# Patient Record
Sex: MALE | Race: BLACK OR AFRICAN AMERICAN | NOT HISPANIC OR LATINO
[De-identification: names, ages, dates, MRNs, and addresses within clinical notes are randomized per-mention and may not be internally consistent; named-entity substitution may affect disease eponyms.]

---

## 2020-05-14 ENCOUNTER — NON-APPOINTMENT (OUTPATIENT)
Age: 50
End: 2020-05-14

## 2020-05-14 ENCOUNTER — APPOINTMENT (OUTPATIENT)
Dept: CARDIOLOGY | Facility: CLINIC | Age: 50
End: 2020-05-14
Payer: COMMERCIAL

## 2020-05-14 VITALS
BODY MASS INDEX: 22.22 KG/M2 | DIASTOLIC BLOOD PRESSURE: 66 MMHG | TEMPERATURE: 98 F | RESPIRATION RATE: 18 BRPM | HEART RATE: 50 BPM | HEIGHT: 69 IN | SYSTOLIC BLOOD PRESSURE: 154 MMHG | OXYGEN SATURATION: 100 % | WEIGHT: 150 LBS

## 2020-05-14 PROCEDURE — 93000 ELECTROCARDIOGRAM COMPLETE: CPT

## 2020-05-14 PROCEDURE — 36415 COLL VENOUS BLD VENIPUNCTURE: CPT

## 2020-05-14 PROCEDURE — 99205 OFFICE O/P NEW HI 60 MIN: CPT

## 2020-05-14 RX ORDER — CHOLECALCIFEROL (VITAMIN D3) 25 MCG
TABLET ORAL
Refills: 0 | Status: ACTIVE | COMMUNITY

## 2020-05-14 RX ORDER — CLOTRIMAZOLE AND BETAMETHASONE DIPROPIONATE 10; .5 MG/G; MG/G
CREAM TOPICAL
Refills: 0 | Status: ACTIVE | COMMUNITY

## 2020-05-18 LAB
ALBUMIN SERPL ELPH-MCNC: 4.6 G/DL
ALP BLD-CCNC: 46 U/L
ALT SERPL-CCNC: 14 U/L
ANION GAP SERPL CALC-SCNC: 12 MMOL/L
AST SERPL-CCNC: 24 U/L
BASOPHILS # BLD AUTO: 0.03 K/UL
BASOPHILS NFR BLD AUTO: 0.6 %
BILIRUB SERPL-MCNC: 0.4 MG/DL
BUN SERPL-MCNC: 9 MG/DL
CALCIUM SERPL-MCNC: 9.6 MG/DL
CHLORIDE SERPL-SCNC: 101 MMOL/L
CHOLEST SERPL-MCNC: 141 MG/DL
CHOLEST/HDLC SERPL: 3 RATIO
CO2 SERPL-SCNC: 25 MMOL/L
CREAT SERPL-MCNC: 1.1 MG/DL
EOSINOPHIL # BLD AUTO: 0.27 K/UL
EOSINOPHIL NFR BLD AUTO: 5.6 %
GLUCOSE SERPL-MCNC: 104 MG/DL
HCT VFR BLD CALC: 35.9 %
HDLC SERPL-MCNC: 47 MG/DL
HGB BLD-MCNC: 11.7 G/DL
IMM GRANULOCYTES NFR BLD AUTO: 0.2 %
LDLC SERPL CALC-MCNC: 84 MG/DL
LYMPHOCYTES # BLD AUTO: 1.17 K/UL
LYMPHOCYTES NFR BLD AUTO: 24.3 %
MAN DIFF?: NORMAL
MCHC RBC-ENTMCNC: 29.9 PG
MCHC RBC-ENTMCNC: 32.6 GM/DL
MCV RBC AUTO: 91.8 FL
MONOCYTES # BLD AUTO: 0.44 K/UL
MONOCYTES NFR BLD AUTO: 9.1 %
NEUTROPHILS # BLD AUTO: 2.89 K/UL
NEUTROPHILS NFR BLD AUTO: 60.2 %
NT-PROBNP SERPL-MCNC: 188 PG/ML
PLATELET # BLD AUTO: 224 K/UL
POTASSIUM SERPL-SCNC: 4.2 MMOL/L
PROT SERPL-MCNC: 7.8 G/DL
RBC # BLD: 3.91 M/UL
RBC # FLD: 13.6 %
SODIUM SERPL-SCNC: 139 MMOL/L
TRIGL SERPL-MCNC: 51 MG/DL
TSH SERPL-ACNC: 1.5 UIU/ML
WBC # FLD AUTO: 4.81 K/UL

## 2020-05-21 ENCOUNTER — APPOINTMENT (OUTPATIENT)
Dept: CV DIAGNOSTICS | Facility: HOSPITAL | Age: 50
End: 2020-05-21

## 2020-05-21 ENCOUNTER — APPOINTMENT (OUTPATIENT)
Dept: CV DIAGNOSITCS | Facility: HOSPITAL | Age: 50
End: 2020-05-21

## 2020-05-21 ENCOUNTER — TRANSCRIPTION ENCOUNTER (OUTPATIENT)
Age: 50
End: 2020-05-21

## 2020-05-21 ENCOUNTER — OUTPATIENT (OUTPATIENT)
Dept: OUTPATIENT SERVICES | Facility: HOSPITAL | Age: 50
LOS: 1 days | End: 2020-05-21
Payer: COMMERCIAL

## 2020-05-21 DIAGNOSIS — R00.1 BRADYCARDIA, UNSPECIFIED: ICD-10-CM

## 2020-05-21 DIAGNOSIS — I49.3 VENTRICULAR PREMATURE DEPOLARIZATION: ICD-10-CM

## 2020-05-21 DIAGNOSIS — I50.9 HEART FAILURE, UNSPECIFIED: ICD-10-CM

## 2020-05-21 PROCEDURE — 93018 CV STRESS TEST I&R ONLY: CPT | Mod: GC

## 2020-05-21 PROCEDURE — 93306 TTE W/DOPPLER COMPLETE: CPT | Mod: 26

## 2020-05-21 PROCEDURE — 93016 CV STRESS TEST SUPVJ ONLY: CPT | Mod: GC

## 2020-05-22 ENCOUNTER — APPOINTMENT (OUTPATIENT)
Dept: CARDIOLOGY | Facility: CLINIC | Age: 50
End: 2020-05-22
Payer: COMMERCIAL

## 2020-05-22 PROCEDURE — 99215 OFFICE O/P EST HI 40 MIN: CPT | Mod: 95

## 2020-06-30 RX ORDER — SACUBITRIL AND VALSARTAN 24; 26 MG/1; MG/1
24-26 TABLET, FILM COATED ORAL TWICE DAILY
Qty: 180 | Refills: 3 | Status: ACTIVE | COMMUNITY
Start: 1900-01-01 | End: 1900-01-01

## 2020-07-06 ENCOUNTER — INPATIENT (INPATIENT)
Facility: HOSPITAL | Age: 50
LOS: 0 days | Discharge: HOME | End: 2020-07-07
Attending: HOSPITALIST | Admitting: HOSPITALIST
Payer: COMMERCIAL

## 2020-07-06 VITALS
HEART RATE: 79 BPM | OXYGEN SATURATION: 100 % | RESPIRATION RATE: 15 BRPM | SYSTOLIC BLOOD PRESSURE: 140 MMHG | DIASTOLIC BLOOD PRESSURE: 88 MMHG | TEMPERATURE: 98 F

## 2020-07-06 DIAGNOSIS — I10 ESSENTIAL (PRIMARY) HYPERTENSION: ICD-10-CM

## 2020-07-06 DIAGNOSIS — Z29.9 ENCOUNTER FOR PROPHYLACTIC MEASURES, UNSPECIFIED: ICD-10-CM

## 2020-07-06 DIAGNOSIS — E87.6 HYPOKALEMIA: ICD-10-CM

## 2020-07-06 DIAGNOSIS — I50.22 CHRONIC SYSTOLIC (CONGESTIVE) HEART FAILURE: ICD-10-CM

## 2020-07-06 DIAGNOSIS — I20.8 OTHER FORMS OF ANGINA PECTORIS: ICD-10-CM

## 2020-07-06 DIAGNOSIS — R07.9 CHEST PAIN, UNSPECIFIED: ICD-10-CM

## 2020-07-06 LAB
ALBUMIN SERPL ELPH-MCNC: 4.6 G/DL — SIGNIFICANT CHANGE UP (ref 3.3–5)
ALP SERPL-CCNC: 51 U/L — SIGNIFICANT CHANGE UP (ref 40–120)
ALT FLD-CCNC: 11 U/L — SIGNIFICANT CHANGE UP (ref 4–41)
ANION GAP SERPL CALC-SCNC: 9 MMO/L — SIGNIFICANT CHANGE UP (ref 7–14)
APTT BLD: 29.9 SEC — SIGNIFICANT CHANGE UP (ref 27–36.3)
AST SERPL-CCNC: 16 U/L — SIGNIFICANT CHANGE UP (ref 4–40)
BASOPHILS # BLD AUTO: 0.01 K/UL — SIGNIFICANT CHANGE UP (ref 0–0.2)
BASOPHILS NFR BLD AUTO: 0.3 % — SIGNIFICANT CHANGE UP (ref 0–2)
BILIRUB SERPL-MCNC: 0.4 MG/DL — SIGNIFICANT CHANGE UP (ref 0.2–1.2)
BUN SERPL-MCNC: 11 MG/DL — SIGNIFICANT CHANGE UP (ref 7–23)
CALCIUM SERPL-MCNC: 9.3 MG/DL — SIGNIFICANT CHANGE UP (ref 8.4–10.5)
CHLORIDE SERPL-SCNC: 103 MMOL/L — SIGNIFICANT CHANGE UP (ref 98–107)
CO2 SERPL-SCNC: 28 MMOL/L — SIGNIFICANT CHANGE UP (ref 22–31)
CREAT SERPL-MCNC: 1.2 MG/DL — SIGNIFICANT CHANGE UP (ref 0.5–1.3)
EOSINOPHIL # BLD AUTO: 0.07 K/UL — SIGNIFICANT CHANGE UP (ref 0–0.5)
EOSINOPHIL NFR BLD AUTO: 1.8 % — SIGNIFICANT CHANGE UP (ref 0–6)
GLUCOSE SERPL-MCNC: 135 MG/DL — HIGH (ref 70–99)
HCT VFR BLD CALC: 37.8 % — LOW (ref 39–50)
HGB BLD-MCNC: 12.9 G/DL — LOW (ref 13–17)
IMM GRANULOCYTES NFR BLD AUTO: 0.3 % — SIGNIFICANT CHANGE UP (ref 0–1.5)
INR BLD: 1.14 — SIGNIFICANT CHANGE UP (ref 0.88–1.17)
LYMPHOCYTES # BLD AUTO: 1.26 K/UL — SIGNIFICANT CHANGE UP (ref 1–3.3)
LYMPHOCYTES # BLD AUTO: 32.2 % — SIGNIFICANT CHANGE UP (ref 13–44)
MCHC RBC-ENTMCNC: 31.5 PG — SIGNIFICANT CHANGE UP (ref 27–34)
MCHC RBC-ENTMCNC: 34.1 % — SIGNIFICANT CHANGE UP (ref 32–36)
MCV RBC AUTO: 92.2 FL — SIGNIFICANT CHANGE UP (ref 80–100)
MONOCYTES # BLD AUTO: 0.29 K/UL — SIGNIFICANT CHANGE UP (ref 0–0.9)
MONOCYTES NFR BLD AUTO: 7.4 % — SIGNIFICANT CHANGE UP (ref 2–14)
NEUTROPHILS # BLD AUTO: 2.27 K/UL — SIGNIFICANT CHANGE UP (ref 1.8–7.4)
NEUTROPHILS NFR BLD AUTO: 58 % — SIGNIFICANT CHANGE UP (ref 43–77)
NRBC # FLD: 0 K/UL — SIGNIFICANT CHANGE UP (ref 0–0)
NT-PROBNP SERPL-SCNC: 70.32 PG/ML — SIGNIFICANT CHANGE UP
PLATELET # BLD AUTO: 195 K/UL — SIGNIFICANT CHANGE UP (ref 150–400)
PMV BLD: 10.1 FL — SIGNIFICANT CHANGE UP (ref 7–13)
POTASSIUM SERPL-MCNC: 3.3 MMOL/L — LOW (ref 3.5–5.3)
POTASSIUM SERPL-SCNC: 3.3 MMOL/L — LOW (ref 3.5–5.3)
PROT SERPL-MCNC: 7.1 G/DL — SIGNIFICANT CHANGE UP (ref 6–8.3)
PROTHROM AB SERPL-ACNC: 13.1 SEC — SIGNIFICANT CHANGE UP (ref 9.8–13.1)
RBC # BLD: 4.1 M/UL — LOW (ref 4.2–5.8)
RBC # FLD: 13.2 % — SIGNIFICANT CHANGE UP (ref 10.3–14.5)
SARS-COV-2 RNA SPEC QL NAA+PROBE: SIGNIFICANT CHANGE UP
SODIUM SERPL-SCNC: 140 MMOL/L — SIGNIFICANT CHANGE UP (ref 135–145)
TROPONIN T, HIGH SENSITIVITY: < 6 NG/L — SIGNIFICANT CHANGE UP (ref ?–14)
WBC # BLD: 3.91 K/UL — SIGNIFICANT CHANGE UP (ref 3.8–10.5)
WBC # FLD AUTO: 3.91 K/UL — SIGNIFICANT CHANGE UP (ref 3.8–10.5)

## 2020-07-06 PROCEDURE — 99223 1ST HOSP IP/OBS HIGH 75: CPT | Mod: GC

## 2020-07-06 PROCEDURE — 71045 X-RAY EXAM CHEST 1 VIEW: CPT | Mod: 26

## 2020-07-06 PROCEDURE — 99284 EMERGENCY DEPT VISIT MOD MDM: CPT

## 2020-07-06 RX ORDER — ASPIRIN/CALCIUM CARB/MAGNESIUM 324 MG
162 TABLET ORAL ONCE
Refills: 0 | Status: COMPLETED | OUTPATIENT
Start: 2020-07-06 | End: 2020-07-06

## 2020-07-06 RX ORDER — POTASSIUM CHLORIDE 20 MEQ
40 PACKET (EA) ORAL ONCE
Refills: 0 | Status: COMPLETED | OUTPATIENT
Start: 2020-07-06 | End: 2020-07-06

## 2020-07-06 RX ORDER — ZINC SULFATE TAB 220 MG (50 MG ZINC EQUIVALENT) 220 (50 ZN) MG
220 TAB ORAL
Refills: 0 | Status: DISCONTINUED | OUTPATIENT
Start: 2020-07-06 | End: 2020-07-07

## 2020-07-06 RX ORDER — SACUBITRIL AND VALSARTAN 24; 26 MG/1; MG/1
1 TABLET, FILM COATED ORAL
Refills: 0 | Status: DISCONTINUED | OUTPATIENT
Start: 2020-07-06 | End: 2020-07-07

## 2020-07-06 RX ORDER — ASPIRIN/CALCIUM CARB/MAGNESIUM 324 MG
81 TABLET ORAL DAILY
Refills: 0 | Status: DISCONTINUED | OUTPATIENT
Start: 2020-07-06 | End: 2020-07-07

## 2020-07-06 RX ORDER — CHOLECALCIFEROL (VITAMIN D3) 125 MCG
1000 CAPSULE ORAL DAILY
Refills: 0 | Status: DISCONTINUED | OUTPATIENT
Start: 2020-07-06 | End: 2020-07-07

## 2020-07-06 RX ORDER — HEPARIN SODIUM 5000 [USP'U]/ML
5000 INJECTION INTRAVENOUS; SUBCUTANEOUS EVERY 8 HOURS
Refills: 0 | Status: DISCONTINUED | OUTPATIENT
Start: 2020-07-06 | End: 2020-07-07

## 2020-07-06 RX ORDER — ACETAMINOPHEN 500 MG
650 TABLET ORAL EVERY 6 HOURS
Refills: 0 | Status: DISCONTINUED | OUTPATIENT
Start: 2020-07-06 | End: 2020-07-07

## 2020-07-06 RX ORDER — ASCORBIC ACID 60 MG
500 TABLET,CHEWABLE ORAL DAILY
Refills: 0 | Status: DISCONTINUED | OUTPATIENT
Start: 2020-07-06 | End: 2020-07-07

## 2020-07-06 RX ADMIN — Medication 81 MILLIGRAM(S): at 18:42

## 2020-07-06 RX ADMIN — Medication 40 MILLIEQUIVALENT(S): at 18:42

## 2020-07-06 RX ADMIN — Medication 40 MILLIEQUIVALENT(S): at 15:34

## 2020-07-06 RX ADMIN — Medication 500 MILLIGRAM(S): at 18:42

## 2020-07-06 RX ADMIN — Medication 162 MILLIGRAM(S): at 12:05

## 2020-07-06 RX ADMIN — SACUBITRIL AND VALSARTAN 1 TABLET(S): 24; 26 TABLET, FILM COATED ORAL at 19:49

## 2020-07-06 NOTE — H&P ADULT - PROBLEM SELECTOR PLAN 1
tele monitor   cardiac enzymes trop<6  Serial EKGs  started ecotrin 81mg po daily  Cardiology Dr. Valdes consulted  -Tylenol PRN pain

## 2020-07-06 NOTE — H&P ADULT - ASSESSMENT
48 yo male PMHx HTN and Systolic HF (on Entresto) p/w chest pain x2days, admitted to tele for atypical chest pain, r/o ACS.

## 2020-07-06 NOTE — H&P ADULT - NSHPLABSRESULTS_GEN_ALL_CORE
EKG: Sinus Rhythm w/ Bigeminy @ 74bpm TWI II, III, AVF, V4-V6.  CXR: neg  Trop<6  COVID: neg  TTE on 5/21/2020: EF 35-40%  Moderate global left ventricular systolic dysfunction.  Frequent ventricular ectopy noted during study.  K+ 3.3 12.9   3.91  )-----------( 195      ( 06 Jul 2020 11:15 )             37.8     07-06    140  |  103  |  11  ----------------------------<  135<H>  3.3<L>   |  28  |  1.20    Ca    9.3      06 Jul 2020 11:15    TPro  7.1  /  Alb  4.6  /  TBili  0.4  /  DBili  x   /  AST  16  /  ALT  11  /  AlkPhos  51  07-06    Trop<6    COVID: neg    EKG 7/6: [x] personally reviewed by me - Sinus Rhythm w/ Bigeminy @ 74bpm TWI II, III, AVF, V4-V6. - per allscripts this is c/w baseline findings     CXR 7/6 - personally reviewed by me - clear lungs    < from: Cardiac Treadmill Stress Test (Non Imaging) (05.21.20 @ 12:57) >    STRESS TEST IMPRESSIONS:  Exercise capacity: 8 METS, Poor for age and gender. 97% of  MPHR.  Chest Pain: No chest pain with exercise.  Symptom: no chest pain.  HR Response: Appropriate.  BP Response: Appropriate.  Heart Rhythm: Normal Sinus Rhythm - Frequent polymorphic  VPD's.  Baseline ECG: T wave abnormality in II , III, aVF, V6,  inverted.  ECG Abnormalities: None.  Arrhythmia: Frequent polymorphic VPDs occurred during  rest, stress and recovery. Frequencyof VPDs was  unaffected by stress. There was one 3-beat run VT at 2:50  recovery.  Morel Score: 8    < end of copied text >    < from: Transthoracic Echocardiogram (05.21.20 @ 10:16) >    CONCLUSIONS:  1. Normal left ventricular internal dimensions and wall  thicknesses.  2. Moderate global left ventricular systolic dysfunction.  Frequent ventricular ectopy noted during study.  3. Normal right ventricular size and function.    < end of copied text >    [x] reviewed May 2020 stress test - unremarkable, baseline EKG with TWI II, III, AVF, V6. TTE May 2020 EF 35-40%, no valvular abnormalities.   [x] d/w cardiology Dr. Valdes - admit to tele monitoring will see in AM

## 2020-07-06 NOTE — ED PROVIDER NOTE - ATTENDING CONTRIBUTION TO CARE
Gong: I have seen and examined the patient face to face, have reviewed and addended the HPI, PE and a/p as necessary.    48 yo M with NICM EF 35-40% and HTN a/w cp x 2 days, localized to the right side described as dull.  Noted to be intermittent, not associated with ambulation, or exertion.  Went to urgent care today found to have abnormal ekg.  Pt reports previous COVID in April, and last stress test and TTE in May.      No fever/chills, No photophobia/eye pain/changes in vision, No ear pain/sore throat/dysphagia, no SOB/cough/wheeze/stridor, No abd pain, No N/V/D, no dysuria/frequency/discharge, No neck/back pain, no rash, no changes in neurological status/function.     EKG TWI in II, III, aVF with new V4 and V5 TWI since May.    GEN - NAD; well appearing; A+O x3; non-toxic appearing; CARD -s1s2, RRR, no M,G,R; PULM - CTA b/l, symmetric breath sounds; ABD -  +BS, ND, NT, soft, no guarding, no rebound, no masses; BACK - no CVA tenderness, Normal  spine; EXT - symmetric pulses, 2+ dp, capillary refill < 2 seconds, no cyanosis, no edema; NEURO - no focal neuro deficits, no slurred speech    48 yo M with NICM EF 35-40% and HTN a/w cp x 2 days, localized to the right side described as dull with new ekg changes, and recent stress and echo, will discuss with Dr. Valdes, and likely admit for possible ACS, check trops, chest xray, repeat ekg, reassess.  Aspirin.

## 2020-07-06 NOTE — H&P ADULT - HISTORY OF PRESENT ILLNESS
50 yo male PMHx HTN and Systolic HF (on Entresto) p/w chest pain x2days. Chest pain right sided pressure + ache, 5/10, non-pleuritic, non-radiating, intermittent in nature. Pt took Tumeric supplement last night and chest pain resolved today. However, he went to [facility] today and found to have an "Abnormal EKG" and was told to see his cardiologist. When he called Dr. Valdes, he was told to come [facility] ED for evaluation. Pt denies fever, chills, cough, congestion, sob, palpitations, nausea, vomiting or abdominal pain.

## 2020-07-06 NOTE — ED PROVIDER NOTE - PHYSICAL EXAMINATION
Gen: AAOx3, non-toxic  Head: NCAT  HEENT: EOMI, oral mucosa moist, normal conjunctiva  Lung: CTAB, no respiratory distress, no wheezes/rhonchi/rales B/L, speaking in full sentences  CV: RRR, no murmurs, rubs or gallops  Abd: soft, NTND, no guarding  MSK: no visible deformities  Neuro: No focal sensory or motor deficits  Skin: Warm, well perfused, no rash  Psych: normal affect.     ~Xavi Parks PGY3

## 2020-07-06 NOTE — ED PROVIDER NOTE - OBJECTIVE STATEMENT
49M with PMH including HTN p/w chest pain that began gradually 2 days ago. Described as dull, right-sided, intermittent, no provoking or palliating factors. Denies any associated symptoms including fever, cough, SOB, LE swelling, abd pain, N/V/D, diaphoresis. Went to urgent care today where he was here to the ER for an abnormal EKG. Of not, pt had COVID in early spring since which time he has experienced frequent palpitations. Had a stress test & TTE in May with his cardiologist Massimo Valdes which was sig for EF of 35-40%.

## 2020-07-06 NOTE — ED PROVIDER NOTE - NS ED ROS FT
ROS:  GENERAL: No fever, no chills  EYES: no change in vision  HEENT: no trouble swallowing, no trouble speaking  CARDIAC: +chest pain  PULMONARY: no cough, no shortness of breath  GI: no abdominal pain, no nausea, no vomiting, no diarrhea, no constipation  : No dysuria, no frequency, no change in appearance, or odor of urine  SKIN: no rashes  NEURO: no headache, no weakness  MSK: No joint pain    Xavi Parks PGY3

## 2020-07-06 NOTE — ED ADULT NURSE NOTE - OBJECTIVE STATEMENT
Pt A+OX4 c/o right sided CP since Saturday.  Denies SOB.  Says he has an irregular rhythm and is on a blood thinner.  [facility] and was told he has an abnormal rhythm and was told to go to the ER.  MD aware of rhythm.  EKG done.  CM placed.  Labs obtained and sent as ordered.  #20g SL R AC placed.  NAD.  VSS.  Will monitor. Pt A+OX4 c/o right sided CP since Saturday.  Denies SOB.  Says he has an irregular rhythm and is on a blood thinner.  [facility] and was told he has an abnormal rhythm and was told to go to the ER.  Yonathan noted on CM.  MD aware of rhythm.  EKG done.  CM placed.  Labs obtained and sent as ordered.  #20g SL R AC placed.  NAD.  VSS.  Will monitor.

## 2020-07-06 NOTE — H&P ADULT - PROBLEM SELECTOR PLAN 3
Monitor BP daily  DASH diet  Continue Entresto  will consider Hydralazine if BP uncontrolled w/ Entresto

## 2020-07-06 NOTE — H&P ADULT - RS GEN PE MLT RESP DETAILS PC
airway patent/respirations non-labored/chest wall tenderness/breath sounds equal/clear to auscultation bilaterally/good air movement/no rales/no rhonchi/no wheezes

## 2020-07-06 NOTE — ED ADULT NURSE REASSESSMENT NOTE - NS ED NURSE REASSESS COMMENT FT1
Pt received from [facility], vitally stable, no complaints at this time. Will continue to monitor.

## 2020-07-06 NOTE — H&P ADULT - NEGATIVE CARDIOVASCULAR SYMPTOMS
no orthopnea/no claudication/no palpitations/no paroxysmal nocturnal dyspnea/no dyspnea on exertion/no peripheral edema

## 2020-07-06 NOTE — ED PROVIDER NOTE - CLINICAL SUMMARY MEDICAL DECISION MAKING FREE TEXT BOX
Chest pain x 2 days in pt with hx of HTN and decreased EF. Found to have new T wave inversions in V4-V5. Pt well appearing, comfortable, HD stable. Low suspicion for dissection or PE based on exam/history. Will assess for ACS, PTX, PNA with labs/XR then determine need for further evaluation/admission.

## 2020-07-06 NOTE — ED ADULT TRIAGE NOTE - CHIEF COMPLAINT QUOTE
pt with abnormal EKG sent from urgent care. pt had right sided chest pain this am. Denies chest pain, shortness of breath, lightheadedness and dizziness.

## 2020-07-06 NOTE — H&P ADULT - PROBLEM SELECTOR PLAN 2
tele monitor  2G Sodium diet with 1500 cc Fluid restriction  Strict I&Os plus Daily weights.  c/w Entresto  Pt currently evolemic

## 2020-07-06 NOTE — H&P ADULT - ATTENDING COMMENTS
49M with hx of HFrEF of unclear etiology (TTE 05/2020 with EF 35-40%, no valvular abnormalities), stress test May 2020 negative, possibly related to myocarditis related to prior COVID-19 infection sent from urgent care for abnormal EKG (TWI in lateral leads). Patient presented to urgent care with reproducible, non-radiating, right sided chest pain, found to have TWI and advised to present to ED. At the time of my exam patient denies chest pain, sob, fevers, chills, cough, abdominal pain, n/v/d, LE swelling.     Cardiac Hx - In the spring patient had COVID-19 infection and palpiations for which he had extensive cardiac work up at [facility] (results not in system, per patient unremarkable). He then then saw Dr. Massimo Valdes in May for second opinion. Stress test May 2020 negative (baseline EKG Normal Sinus Rhythm - Frequent polymorphic VPD's T wave abnormality in II , III, aVF, V6, inverted). TTE with EF 35-40%, no valvular abnormalities. Patient was recommended to start a low dose BB for PVCs which patient has not been taking. He has been adherent with prescribed Entresto. He was planned for outpatient cMRI which he has not gotten yet.    VS - BP elevated 172/75. Physcial exam with reproducbile chest pain on right side. Reviewed admission labs/EKG - Troponin <6, ProBNP 70, Hypokalemic 3.3, Hb 12.6. CXR clear lungs. EKG with TWI in II, III, AVF, V6 - per allscripts review this is reflective of baseline EKG.     A/P -     49M with hx of HFrEF of unclear etiology (TTE 05/2020 with EF 35-40%, no valvular abnormalities), stress May 2020 negative, pending outpatient cMRI, sent from  for abnormal EKG and right sided chest pain likely MSK in origin. r/o for ACS (Trop negative, EKG with baseline changes per Allscripts - TWI in II, III, AVF, V6).     Admit for telemetry monitoring. Check ESR/CRP with AM labs, TSH on outpatient labs wnl. Pain control with tyelnol PRN, resume home entresto, low salt diet. Repeat EKG in AM. Cardiology Dr. Massimo Valdes to see patient in AM.     Anemia - Allscripts hemoglobin 11.7 May 2020, currently 12.6 macrocytic, check iron studies/B12/folate in AM. Patient has never had screening colonosocpy - was planning to do this after resolution of cardiac issues. 49M with hx of HFrEF of unclear etiology (TTE 05/2020 with EF 35-40%, no valvular abnormalities), stress test May 2020 negative, possibly related to myocarditis related to prior COVID-19 infection sent from urgent care for abnormal EKG (TWI in lateral leads). Patient presented to urgent care with reproducible, non-radiating, non-pleuritic right sided chest pain, found to have TWI and advised to present to ED. At the time of my exam patient denies chest pain, sob, fevers, chills, cough, abdominal pain, n/v/d, LE swelling.     Cardiac Hx - In the spring patient had COVID-19 infection and palpiations for which he had extensive cardiac work up at [facility] (results not in system, per patient unremarkable). He then then saw Dr. Massimo Valdes in May for second opinion. Stress test May 2020 negative (baseline EKG Normal Sinus Rhythm - Frequent polymorphic VPD's T wave abnormality in II , III, aVF, V6, inverted). TTE with EF 35-40%, no valvular abnormalities. Patient was recommended to start a low dose BB for PVCs which patient has not been taking. He has been adherent with prescribed Entresto. He was planned for outpatient cMRI which he has not gotten yet.    VS - BP elevated 172/75. Physcial exam with reproducbile chest pain on right side. Reviewed admission labs/EKG - Troponin <6, ProBNP 70, Hypokalemic 3.3, Hb 12.6. CXR clear lungs. EKG with TWI in II, III, AVF, V6 - per allscripts review this is reflective of baseline EKG.     A/P -     49M with hx of HFrEF of unclear etiology (TTE 05/2020 with EF 35-40%, no valvular abnormalities), stress May 2020 negative, pending outpatient cMRI, sent from  for abnormal EKG and right sided chest pain likely MSK in origin. r/o for ACS (Trop negative, EKG with baseline changes per Allscripts - TWI in II, III, AVF, V6).     Admit for telemetry monitoring. Check ESR/CRP with AM labs, TSH on outpatient labs wnl. Pain control with tyelnol PRN, resume home entresto, low salt diet. Repeat EKG in AM. Cardiology Dr. Massimo Valdes to see patient in AM.     Anemia - Allscripts hemoglobin 11.7 May 2020, currently 12.6 macrocytic, check iron studies/B12/folate in AM. Patient has never had screening colonosocpy - was planning to do this after resolution of cardiac issues.

## 2020-07-07 ENCOUNTER — TRANSCRIPTION ENCOUNTER (OUTPATIENT)
Age: 50
End: 2020-07-07

## 2020-07-07 VITALS
DIASTOLIC BLOOD PRESSURE: 52 MMHG | SYSTOLIC BLOOD PRESSURE: 128 MMHG | RESPIRATION RATE: 18 BRPM | HEART RATE: 78 BPM | OXYGEN SATURATION: 98 %

## 2020-07-07 DIAGNOSIS — Z02.9 ENCOUNTER FOR ADMINISTRATIVE EXAMINATIONS, UNSPECIFIED: ICD-10-CM

## 2020-07-07 LAB
ANION GAP SERPL CALC-SCNC: 13 MMO/L — SIGNIFICANT CHANGE UP (ref 7–14)
BUN SERPL-MCNC: 12 MG/DL — SIGNIFICANT CHANGE UP (ref 7–23)
CALCIUM SERPL-MCNC: 9.5 MG/DL — SIGNIFICANT CHANGE UP (ref 8.4–10.5)
CHLORIDE SERPL-SCNC: 103 MMOL/L — SIGNIFICANT CHANGE UP (ref 98–107)
CO2 SERPL-SCNC: 26 MMOL/L — SIGNIFICANT CHANGE UP (ref 22–31)
CREAT SERPL-MCNC: 1.21 MG/DL — SIGNIFICANT CHANGE UP (ref 0.5–1.3)
CRP SERPL-MCNC: < 4 MG/L — SIGNIFICANT CHANGE UP
ERYTHROCYTE [SEDIMENTATION RATE] IN BLOOD: 4 MM/HR — SIGNIFICANT CHANGE UP (ref 1–15)
FERRITIN SERPL-MCNC: 288.3 NG/ML — SIGNIFICANT CHANGE UP (ref 30–400)
FOLATE SERPL-MCNC: 16.1 NG/ML — SIGNIFICANT CHANGE UP (ref 4.7–20)
GLUCOSE SERPL-MCNC: 104 MG/DL — HIGH (ref 70–99)
HCT VFR BLD CALC: 39 % — SIGNIFICANT CHANGE UP (ref 39–50)
HGB BLD-MCNC: 13.1 G/DL — SIGNIFICANT CHANGE UP (ref 13–17)
IRON SATN MFR SERPL: 240 UG/DL — SIGNIFICANT CHANGE UP (ref 155–535)
IRON SATN MFR SERPL: 49 UG/DL — SIGNIFICANT CHANGE UP (ref 45–165)
MAGNESIUM SERPL-MCNC: 2.4 MG/DL — SIGNIFICANT CHANGE UP (ref 1.6–2.6)
MCHC RBC-ENTMCNC: 30.8 PG — SIGNIFICANT CHANGE UP (ref 27–34)
MCHC RBC-ENTMCNC: 33.6 % — SIGNIFICANT CHANGE UP (ref 32–36)
MCV RBC AUTO: 91.5 FL — SIGNIFICANT CHANGE UP (ref 80–100)
NRBC # FLD: 0 K/UL — SIGNIFICANT CHANGE UP (ref 0–0)
PHOSPHATE SERPL-MCNC: 4.3 MG/DL — SIGNIFICANT CHANGE UP (ref 2.5–4.5)
PLATELET # BLD AUTO: 195 K/UL — SIGNIFICANT CHANGE UP (ref 150–400)
PMV BLD: 10.4 FL — SIGNIFICANT CHANGE UP (ref 7–13)
POTASSIUM SERPL-MCNC: 3.7 MMOL/L — SIGNIFICANT CHANGE UP (ref 3.5–5.3)
POTASSIUM SERPL-SCNC: 3.7 MMOL/L — SIGNIFICANT CHANGE UP (ref 3.5–5.3)
RBC # BLD: 4.26 M/UL — SIGNIFICANT CHANGE UP (ref 4.2–5.8)
RBC # FLD: 13.2 % — SIGNIFICANT CHANGE UP (ref 10.3–14.5)
SODIUM SERPL-SCNC: 142 MMOL/L — SIGNIFICANT CHANGE UP (ref 135–145)
TROPONIN T, HIGH SENSITIVITY: 7 NG/L — SIGNIFICANT CHANGE UP (ref ?–14)
UIBC SERPL-MCNC: 191.1 UG/DL — SIGNIFICANT CHANGE UP (ref 110–370)
VIT B12 SERPL-MCNC: 605 PG/ML — SIGNIFICANT CHANGE UP (ref 200–900)
WBC # BLD: 4.86 K/UL — SIGNIFICANT CHANGE UP (ref 3.8–10.5)
WBC # FLD AUTO: 4.86 K/UL — SIGNIFICANT CHANGE UP (ref 3.8–10.5)

## 2020-07-07 PROCEDURE — 99223 1ST HOSP IP/OBS HIGH 75: CPT

## 2020-07-07 PROCEDURE — 99233 SBSQ HOSP IP/OBS HIGH 50: CPT

## 2020-07-07 PROCEDURE — 99239 HOSP IP/OBS DSCHRG MGMT >30: CPT

## 2020-07-07 PROCEDURE — 70450 CT HEAD/BRAIN W/O DYE: CPT | Mod: 26

## 2020-07-07 RX ORDER — ASPIRIN/CALCIUM CARB/MAGNESIUM 324 MG
1 TABLET ORAL
Qty: 0 | Refills: 0 | DISCHARGE
Start: 2020-07-07

## 2020-07-07 RX ADMIN — Medication 1000 UNIT(S): at 14:08

## 2020-07-07 RX ADMIN — Medication 81 MILLIGRAM(S): at 14:08

## 2020-07-07 RX ADMIN — Medication 500 MILLIGRAM(S): at 06:41

## 2020-07-07 RX ADMIN — SACUBITRIL AND VALSARTAN 1 TABLET(S): 24; 26 TABLET, FILM COATED ORAL at 09:15

## 2020-07-07 NOTE — PROGRESS NOTE ADULT - SUBJECTIVE AND OBJECTIVE BOX
PROGRESS NOTE:     Patient is a 49y old  Male who presents with a chief complaint of chest pain (06 Jul 2020 16:28)      SUBJECTIVE / OVERNIGHT EVENTS:    ADDITIONAL REVIEW OF SYSTEMS:    MEDICATIONS  (STANDING):  ascorbic acid 500 milliGRAM(s) Oral daily  aspirin enteric coated 81 milliGRAM(s) Oral daily  cholecalciferol 1000 Unit(s) Oral daily  heparin   Injectable 5000 Unit(s) SubCutaneous every 8 hours  sacubitril 24 mG/valsartan 26 mG 1 Tablet(s) Oral two times a day  zinc sulfate 220 milliGRAM(s) Oral <User Schedule>    MEDICATIONS  (PRN):  acetaminophen   Tablet .. 650 milliGRAM(s) Oral every 6 hours PRN Mild Pain (1 - 3), Moderate Pain (4 - 6)      CAPILLARY BLOOD GLUCOSE        I&O's Summary      PHYSICAL EXAM:  Vital Signs Last 24 Hrs  T(C): 37.1 (07 Jul 2020 05:43), Max: 37.1 (07 Jul 2020 05:43)  T(F): 98.8 (07 Jul 2020 05:43), Max: 98.8 (07 Jul 2020 05:43)  HR: 72 (07 Jul 2020 08:00) (72 - 90)  BP: 118/82 (07 Jul 2020 08:00) (118/82 - 172/75)  BP(mean): --  RR: 16 (07 Jul 2020 08:00) (15 - 20)  SpO2: 100% (07 Jul 2020 08:00) (95% - 100%)    CONSTITUTIONAL: NAD, well-developed  RESPIRATORY: Normal respiratory effort; lungs are clear to auscultation bilaterally  CARDIOVASCULAR: Regular rate and rhythm, normal S1 and S2, no murmur/rub/gallop; No lower extremity edema; Peripheral pulses are 2+ bilaterally  ABDOMEN: Nontender to palpation, normoactive bowel sounds, no rebound/guarding; No hepatosplenomegaly  MUSCLOSKELETAL: no clubbing or cyanosis of digits; no joint swelling or tenderness to palpation  PSYCH: A+O to person, place, and time; affect appropriate  NEURO:  SKIN:    LABS:                        13.1   4.86  )-----------( 195      ( 07 Jul 2020 05:07 )             39.0     07-07    142  |  103  |  12  ----------------------------<  104<H>  3.7   |  26  |  1.21    Ca    9.5      07 Jul 2020 05:07  Phos  4.3     07-07  Mg     2.4     07-07    TPro  7.1  /  Alb  4.6  /  TBili  0.4  /  DBili  x   /  AST  16  /  ALT  11  /  AlkPhos  51  07-06    PT/INR - ( 06 Jul 2020 11:15 )   PT: 13.1 SEC;   INR: 1.14          PTT - ( 06 Jul 2020 11:15 )  PTT:29.9 SEC            RADIOLOGY & ADDITIONAL TESTS:  Results Reviewed:   Imaging Personally Reviewed:  Electrocardiogram Personally Reviewed:    COORDINATION OF CARE:  Care Discussed with Consultants/Other Providers [Y/N]:  Prior or Outpatient Records Reviewed [Y/N]: PROGRESS NOTE:     Patient is a 49y old  Male who presents with a chief complaint of chest pain (06 Jul 2020 16:28)      SUBJECTIVE / OVERNIGHT EVENTS: pt seen and examined by me   C/o Chest discomfort, intermittent since years,. worse over past few months  Denies SOB   C/o headaches abnormal sensation in head     ADDITIONAL REVIEW OF SYSTEMS:    MEDICATIONS  (STANDING):  ascorbic acid 500 milliGRAM(s) Oral daily  aspirin enteric coated 81 milliGRAM(s) Oral daily  cholecalciferol 1000 Unit(s) Oral daily  heparin   Injectable 5000 Unit(s) SubCutaneous every 8 hours  sacubitril 24 mG/valsartan 26 mG 1 Tablet(s) Oral two times a day  zinc sulfate 220 milliGRAM(s) Oral <User Schedule>    MEDICATIONS  (PRN):  acetaminophen   Tablet .. 650 milliGRAM(s) Oral every 6 hours PRN Mild Pain (1 - 3), Moderate Pain (4 - 6)      CAPILLARY BLOOD GLUCOSE        I&O's Summary      PHYSICAL EXAM:  Vital Signs Last 24 Hrs  T(C): 37.1 (07 Jul 2020 05:43), Max: 37.1 (07 Jul 2020 05:43)  T(F): 98.8 (07 Jul 2020 05:43), Max: 98.8 (07 Jul 2020 05:43)  HR: 72 (07 Jul 2020 08:00) (72 - 90)  BP: 118/82 (07 Jul 2020 08:00) (118/82 - 172/75)  BP(mean): --  RR: 16 (07 Jul 2020 08:00) (15 - 20)  SpO2: 100% (07 Jul 2020 08:00) (95% - 100%)    CONSTITUTIONAL: NAD, well-developed  RESPIRATORY: Normal respiratory effort; lungs are clear to auscultation bilaterally  CARDIOVASCULAR: Regular rate and rhythm, normal S1 and S2, no murmur/rub/gallop; No lower extremity edema; Peripheral pulses are 2+ bilaterally  ABDOMEN: Nontender to palpation, normoactive bowel sounds, no rebound/guarding; No hepatosplenomegaly  MUSCLOSKELETAL: no clubbing or cyanosis of digits; no joint swelling or tenderness to palpation  PSYCH: A+O to person, place, and time; affect appropriate  NEURO: No focal deficits  SKIN: NAD     LABS:                        13.1   4.86  )-----------( 195      ( 07 Jul 2020 05:07 )             39.0     07-07    142  |  103  |  12  ----------------------------<  104<H>  3.7   |  26  |  1.21    Ca    9.5      07 Jul 2020 05:07  Phos  4.3     07-07  Mg     2.4     07-07    TPro  7.1  /  Alb  4.6  /  TBili  0.4  /  DBili  x   /  AST  16  /  ALT  11  /  AlkPhos  51  07-06    PT/INR - ( 06 Jul 2020 11:15 )   PT: 13.1 SEC;   INR: 1.14          PTT - ( 06 Jul 2020 11:15 )  PTT:29.9 SEC            RADIOLOGY & ADDITIONAL TESTS:  Results Reviewed:  CT head - unremarkable   Imaging Personally Reviewed:  Electrocardiogram Personally Reviewed:    COORDINATION OF CARE:  Care Discussed with Consultants/Other Providers [Y/N]: Dr BARRAGAN:alvin   Prior or Outpatient Records Reviewed [Y/N]: Dr Valdes

## 2020-07-07 NOTE — DISCHARGE NOTE PROVIDER - CARE PROVIDERS DIRECT ADDRESSES
[email or web address]

## 2020-07-07 NOTE — DISCHARGE NOTE PROVIDER - PROVIDER TOKENS
PROVIDER:[TOKEN:[[id number]]] PROVIDER:[TOKEN:[[id number]]],PROVIDER:[TOKEN:[[id number]]]

## 2020-07-07 NOTE — DISCHARGE NOTE PROVIDER - NSDCMRMEDTOKEN_GEN_ALL_CORE_FT
Entresto 24 mg-26 mg oral tablet: 1 tab(s) orally 2 times a day  Vitamin C 500 mg oral tablet: 1 tab(s) orally once a day  Vitamin D3 1000 intl units (25 mcg) oral tablet: 1 tab(s) orally once a day  Zinc 140 mg (as elemental zinc 50 mg) oral tablet: 1 tab(s) orally once a day

## 2020-07-07 NOTE — CONSULT NOTE ADULT - SUBJECTIVE AND OBJECTIVE BOX
CHIEF COMPLAINT:    HISTORY OF PRESENT ILLNESS:    PAST MEDICAL & SURGICAL HISTORY:  HTN (hypertension)  Chronic systolic heart failure          PREVIOUS DIAGNOSTIC TESTING:    Echocardiogram:   Catheterization:  Stress Test:  	    MEDICATIONS:  aspirin enteric coated 81 milliGRAM(s) Oral daily  heparin   Injectable 5000 Unit(s) SubCutaneous every 8 hours  sacubitril 24 mG/valsartan 26 mG 1 Tablet(s) Oral two times a day        acetaminophen   Tablet .. 650 milliGRAM(s) Oral every 6 hours PRN        ascorbic acid 500 milliGRAM(s) Oral daily  cholecalciferol 1000 Unit(s) Oral daily  zinc sulfate 220 milliGRAM(s) Oral <User Schedule>      FAMILY HISTORY:  No pertinent family history in first degree relatives      SOCIAL HISTORY:      [ ] Smoker  [ ] Alcohol  [ ] Drugs    Allergies    No Known Allergies    Intolerances    	    REVIEW OF SYSTEMS:  CONSTITUTIONAL: No fever, weight loss, or fatigue  EYES: No eye pain, visual disturbances, or discharge  ENMT:  No difficulty hearing, tinnitus, vertigo; No sinus or throat pain  NECK: No pain or stiffness  RESPIRATORY: No cough, wheezing, chills or hemoptysis; No Shortness of Breath  CARDIOVASCULAR: No chest pain, palpitations, passing out, dizziness, or leg swelling  GASTROINTESTINAL: No abdominal or epigastric pain. No nausea, vomiting, or hematemesis; No diarrhea or constipation. No melena or hematochezia.  GENITOURINARY: No dysuria, frequency, hematuria, or incontinence  NEUROLOGICAL: No headaches, memory loss, loss of strength, numbness, or tremors  SKIN: No itching, burning, rashes, or lesions   LYMPH Nodes: No enlarged glands  ENDOCRINE: No heat or cold intolerance; No hair loss  MUSCULOSKELETAL: No joint pain or swelling; No muscle, back, or extremity pain  PSYCHIATRIC: No depression, anxiety, mood swings, or difficulty sleeping  HEME/LYMPH: No easy bruising, or bleeding gums  ALLERY AND IMMUNOLOGIC: No hives or eczema	    [ ] All others negative	  [ ] Unable to obtain    PHYSICAL EXAM:  T(C): 37.1 (07-07-20 @ 05:43), Max: 37.1 (07-07-20 @ 05:43)  HR: 78 (07-07-20 @ 14:04) (72 - 79)  BP: 128/52 (07-07-20 @ 14:04) (118/82 - 150/79)  RR: 18 (07-07-20 @ 14:04) (16 - 19)  SpO2: 98% (07-07-20 @ 14:04) (95% - 100%)  Wt(kg): --  I&O's Summary      Appearance: Normal	  HEENT:   Normal oral mucosa, PERRL, EOMI	  Lymphatic: No lymphadenopathy  Cardiovascular: Normal S1 S2, No JVD, No murmurs, No edema  Respiratory: Lungs clear to auscultation	  Psychiatry: A & O x 3, Mood & affect appropriate  Gastrointestinal:  Soft, Non-tender, + BS	  Skin: No rashes, No ecchymoses, No cyanosis	  Neurologic: Non-focal  Extremities: Normal range of motion, No clubbing, cyanosis or edema  Vascular: Peripheral pulses palpable 2+ bilaterally    TELEMETRY: 	    ECG:  	  RADIOLOGY:  OTHER: 	  	  LABS:	 	    CARDIAC MARKERS:                                  13.1   4.86  )-----------( 195      ( 07 Jul 2020 05:07 )             39.0     07-07    142  |  103  |  12  ----------------------------<  104<H>  3.7   |  26  |  1.21    Ca    9.5      07 Jul 2020 05:07  Phos  4.3     07-07  Mg     2.4     07-07    TPro  7.1  /  Alb  4.6  /  TBili  0.4  /  DBili  x   /  AST  16  /  ALT  11  /  AlkPhos  51  07-06    proBNP:   Lipid Profile:   HgA1c:   TSH: CHIEF COMPLAINT: Called to evaluate patient with PVCs     HISTORY OF PRESENT ILLNESS:  48 yo male PMHx HTN and Systolic HF (on Entresto) p/w chest pain x2days. Patient reports that he took Tumeric supplement and chest pain resolved. However, [facility] and found to have an "Abnormal EKG" and was told to see his cardiologist. When he called Dr. Valdes, he was told to come [facility] ED for evaluation. EKG revealed sinus rhythm with PVCs, bigeminy. Telemetry shows sinus rhythm with PVCs and HR 60s-80s. Echocardiogram revealed moderate global LV systolic dysfunction with EF 35-40%. Pt denies fever, chills, cough, sob, palpitations, nausea, vomiting, dizziness, syncope or near syncope.      PAST MEDICAL & SURGICAL HISTORY:  HTN (hypertension)  Chronic systolic heart failure    PREVIOUS DIAGNOSTIC TESTING:    Echocardiogram: 5/21/20:   DIMENSIONS:  Dimensions:     Normal Values:  LA:     3.7 cm    2.0 - 4.0 cm  Ao:     2.4 cm    2.0 - 3.8 cm  SEPTUM: 0.9 cm    0.6 - 1.2 cm  PWT:    1.0 cm    0.6 - 1.1 cm  LVIDd:  5.7 cm    3.0 - 5.6 cm  LVIDs:  4.8 cm    1.8 - 4.0 cm  Derived Variables:  LVMI: 115 g/m2  RWT: 0.35  Fractional short: 16 %  Ejection Fraction (Visual Estimate): 35-40 %  ------------------------------------------------------------------------  OBSERVATIONS:  Mitral Valve: Normal mitral valve.  Aortic Root: Normal aortic root.  Aortic Valve: Normal trileaflet aortic valve.  Left Atrium: Normal left atrium.  Left Ventricle: Moderate global left ventricular systolic  dysfunction. Frequent ventricular ectopy noted during  study. Normal left ventricular internal dimensions and wall  thicknesses.  Right Heart: Normal right atrium. Normal right ventricular  size and function. Normal tricuspid valve. Normal pulmonic  valve. Mild pulmonic regurgitation.  Pericardium/PleuraNormal pericardium with no pericardial  effusion.  Hemodynamic: Inadequate tricuspid regurgitation Doppler  envelope precludes estimation of PASP.  ------------------------------------------------------------------------  CONCLUSIONS:  1. Normal left ventricular internal dimensions and wall  thicknesses.  2. Moderate global left ventricular systolic dysfunction.  Frequent ventricular ectopy noted during study.  3. Normal right ventricular size and function.    Stress Test:  5/21/20:   STRESS TEST IMPRESSIONS:  Exercise capacity: 8 METS, Poor for age and gender. 97% of  MPHR.  Chest Pain: No chest pain with exercise.  Symptom: no chest pain.  HR Response: Appropriate.  BP Response: Appropriate.  Heart Rhythm: Normal Sinus Rhythm - Frequent polymorphic  VPD's.  Baseline ECG: T wave abnormality in II , III, aVF, V6,  inverted.  ECG Abnormalities: None.  Arrhythmia: Frequent polymorphic VPDs occurred during  rest, stress and recovery. Frequency of VPDs was  unaffected by stress. There was one 3-beat run VT at 2:50  recovery.  Morel Score: 8  	    MEDICATIONS:  aspirin enteric coated 81 milliGRAM(s) Oral daily  heparin   Injectable 5000 Unit(s) SubCutaneous every 8 hours  sacubitril 24 mG/valsartan 26 mG 1 Tablet(s) Oral two times a day  acetaminophen   Tablet .. 650 milliGRAM(s) Oral every 6 hours PRN  ascorbic acid 500 milliGRAM(s) Oral daily  cholecalciferol 1000 Unit(s) Oral daily  zinc sulfate 220 milliGRAM(s) Oral <User Schedule>      FAMILY HISTORY:  No pertinent family history in first degree relatives      SOCIAL HISTORY:      [-] Smoker  [-] Alcohol  [-] Drugs    Allergies    No Known Allergies    Intolerances    	    REVIEW OF SYSTEMS:  CONSTITUTIONAL: No fever, weight loss, or fatigue  EYES: No eye pain, visual disturbances, or discharge  ENMT:  No difficulty hearing, tinnitus, vertigo; No sinus or throat pain  NECK: No pain or stiffness  RESPIRATORY: No cough, wheezing, chills or hemoptysis; No Shortness of Breath  CARDIOVASCULAR: No chest pain, palpitations, passing out, dizziness, or leg swelling  GASTROINTESTINAL: No abdominal or epigastric pain. No nausea, vomiting, or hematemesis; No diarrhea or constipation. No melena or hematochezia.  GENITOURINARY: No dysuria, frequency, hematuria, or incontinence  NEUROLOGICAL: No headaches, memory loss, loss of strength, numbness, or tremors  SKIN: No itching, burning, rashes, or lesions   LYMPH Nodes: No enlarged glands  ENDOCRINE: No heat or cold intolerance; No hair loss  MUSCULOSKELETAL: No joint pain or swelling; No muscle, back, or extremity pain  PSYCHIATRIC: No depression, anxiety, mood swings, or difficulty sleeping  HEME/LYMPH: No easy bruising, or bleeding gums  ALLERY AND IMMUNOLOGIC: No hives or eczema	    [X] All others negative	  [ ] Unable to obtain    PHYSICAL EXAM:  T(C): 37.1 (07-07-20 @ 05:43), Max: 37.1 (07-07-20 @ 05:43)  HR: 78 (07-07-20 @ 14:04) (72 - 79)  BP: 128/52 (07-07-20 @ 14:04) (118/82 - 150/79)  RR: 18 (07-07-20 @ 14:04) (16 - 19)  SpO2: 98% (07-07-20 @ 14:04) (95% - 100%)  Wt(kg): --  I&O's Summary      Appearance: Normal	  HEENT:   Normal oral mucosa, PERRL, EOMI	  Lymphatic: No lymphadenopathy  Cardiovascular: Normal S1 S2, No JVD, No murmurs, No edema  Respiratory: Lungs clear to auscultation	  Psychiatry: A & O x 3, Mood & affect appropriate  Gastrointestinal:  Soft, Non-tender, + BS	  Skin: No rashes, No ecchymoses, No cyanosis	  Neurologic: Non-focal  Extremities: Normal range of motion, No clubbing, cyanosis or edema  Vascular: Peripheral pulses palpable 2+ bilaterally    TELEMETRY: Sinus rhythm with PVCs, HR 60s-80s	    ECG:  Sinus rhythm with PVCs	  RADIOLOGY:  OTHER: 	  	  LABS:	 	    CARDIAC MARKERS:                        13.1   4.86  )-----------( 195      ( 07 Jul 2020 05:07 )             39.0     07-07    142  |  103  |  12  ----------------------------<  104<H>  3.7   |  26  |  1.21    Ca    9.5      07 Jul 2020 05:07  Phos  4.3     07-07  Mg     2.4     07-07    TPro  7.1  /  Alb  4.6  /  TBili  0.4  /  DBili  x   /  AST  16  /  ALT  11  /  AlkPhos  51  07-06

## 2020-07-07 NOTE — PROGRESS NOTE ADULT - PROBLEM SELECTOR PLAN 1
EKG- Old TWIs in II, III, aVF, V6. New TWIs in V4/V5.  cardiac enzymes trop<6  On  ecotrin 81mg po daily  DW Cardiology Dr. Valdes - Pt has had outpt work up for chest pain, known to have bigemini.  Pt has been approved for outpt MRI. EP consulted- FU EP Consult  -Tylenol PRN pain EKG- Old TWIs in II, III, aVF, V6. New TWIs in V4/V5.  cardiac enzymes trop<6  NST- 5/2020-Frequent polymorphic VPDs occurred during rest, stress and recovery. Frequency of VPDs was unaffected by stress. There was one 3-beat run VT at 2:50 recovery  ECHO 5/2020- Moderate global left ventricular systolic dysfunction. EF of 35-40%   On  ecotrin 81mg po daily  DW Cardiology Dr. Valdes - Pt has had outpt work up for chest pain, known to have bigemini.  Pt has been approved for outpt MRI. EP consulted- FU EP Consult  -Tylenol PRN pain

## 2020-07-07 NOTE — DISCHARGE NOTE PROVIDER - CARE PROVIDER_API CALL
Massimo Valdes  CARDIOLOGY  [address] [address] [place] [address]  Phone: [phone]  Fax: [phone]  Follow Up Time: Massimo Valdes  CARDIOLOGY  [address] [place] [address]  Phone: [phone]  Fax: [phone]  Follow Up Time:     Merna Tello  CARDIAC ELECTROPHYSIOLOGY  [address] [place] [address]  Phone: [phone]  Fax: [phone]  Follow Up Time:

## 2020-07-07 NOTE — CONSULT NOTE ADULT - ATTENDING COMMENTS
50 yo male PMHx HTN and Systolic HF (on Entresto) p/w chest pain x2days. Patient reports that he took Tumeric supplement and chest pain resolved. However, he went to [facility] and found to have an "Abnormal EKG" and was told to see his cardiologist. When he called Dr. Valdes, he was told to come [facility] ED for evaluation. EKG revealed sinus rhythm with PVCs, bigeminy. Telemetry shows sinus rhythm with PVCs and HR 60s-80s. Echocardiogram revealed moderate global LV systolic dysfunction with EF 35-40%. Will discuss PVC management including ablation as outpt. Will follow.

## 2020-07-07 NOTE — PROGRESS NOTE ADULT - PROBLEM SELECTOR PLAN 2
tele monitor  2G Sodium diet with 1500 cc Fluid restriction  Strict I&Os plus Daily weights.  c/w Entresto  Pt currently evolemic tele monitor  2G Sodium diet with 1500 cc Fluid restriction  Strict I&Os plus Daily weights.  c/w Entresto  Pt currently evolemic  Pro BNP- WNL

## 2020-07-07 NOTE — CONSULT NOTE ADULT - SUBJECTIVE AND OBJECTIVE BOX
Cardiology/Vascular Medicine Inpatient Consultation Note      HISTORY OF PRESENT ILLNESS:  Patient known to me from the office.  He is a 48 yo man with HTN and Systolic HF (on Entresto) p/w chest pain x2days. Chest pain right sided pressure + ache, 5/10, non-pleuritic, non-radiating, intermittent in nature. Pt took Tumeric supplement last night and chest pain resolved today. However, he went to [facility] today and found to have an "Abnormal EKG" and was told to see his cardiologist. When he called Dr. Valdes, he was told to come [facility] ED for evaluation. Pt denies fever, chills, cough, congestion, sob, palpitations, nausea, vomiting or abdominal pain. (2020 16:28)          Allergies  No Known Allergies    MEDICATIONS:  aspirin enteric coated 81 milliGRAM(s) Oral daily  heparin   Injectable 5000 Unit(s) SubCutaneous every 8 hours  sacubitril 24 mG/valsartan 26 mG 1 Tablet(s) Oral two times a day  acetaminophen   Tablet .. 650 milliGRAM(s) Oral every 6 hours PRN  ascorbic acid 500 milliGRAM(s) Oral daily  cholecalciferol 1000 Unit(s) Oral daily  zinc sulfate 220 milliGRAM(s) Oral <User Schedule>    PAST MEDICAL & SURGICAL HISTORY:  HTN (hypertension)  Chronic systolic heart failure    FAMILY HISTORY:  No pertinent family history in first degree relatives    SOCIAL HISTORY:    As above    REVIEW OF SYSTEMS:      PHYSICAL EXAM:  T(C): 37.1 (20 @ 05:43), Max: 37.1 (20 @ 05:43)  HR: 72 (20 @ 08:00) (72 - 90)  BP: 118/82 (20 @ 08:00) (118/82 - 172/75)  RR: 16 (20 @ 08:00) (16 - 19)  SpO2: 100% (20 @ 08:00) (95% - 100%)    Appearance: NAD  HEENT:   Normal oral mucosa, PERRL, EOMI	  Cardiovascular: Normal S1 S2, No JVD, 2-3/6 SM  Respiratory: Decreased breath sounds bilaterally  Psychiatry: Awake, alert  Gastrointestinal:  Soft, Non-tender, + BS	  Skin: No rashes, No ecchymoses, No cyanosis	  Neurologic: Non-focal  Extremities: No edema      LABS:	 	                          13.1   4.86  )-----------( 195      ( 2020 05:07 )             39.0     07-07    142  |  103  |  12  ----------------------------<  104<H>  3.7   |  26  |  1.21  07-06    140  |  103  |  11  ----------------------------<  135<H>  3.3<L>   |  28  |  1.20    Ca    9.5      2020 05:07  Ca    9.3      2020 11:15  Phos  4.3     07-07  Mg     2.4     07-07    TPro  7.1  /  Alb  4.6  /  TBili  0.4  /  DBili  x   /  AST  16  /  ALT  11  /  AlkPhos  51  07-06    < from: Transthoracic Echocardiogram (20 @ 10:16) >    Patient name: JOSEFINA TERAN  YOB: 1970   Age: 49 (M)   MR#: [id number]  Study Date: 2020  Location: O/PSonographer: TISH Johnson  Study quality: Technically good  Referring Physician: Massimo Valdes MD  Blood Pressure: 145/75 mmHg  Height: 175 cm  Weight: 69 kg  BSA: 1.8 m2  ------------------------------------------------------------------------  PROCEDURE: Transthoracic echocardiogram with 2-D, M-Mode  and complete spectral and color flow Doppler.  INDICATION: Abnormal electrocardiogram (ECG) (EKG) (R94.31)  ------------------------------------------------------------------------  DIMENSIONS:  Dimensions:     Normal Values:  LA:     3.7 cm    2.0 - 4.0 cm  Ao:     2.4 cm    2.0 - 3.8 cm  SEPTUM: 0.9 cm    0.6 - 1.2 cm  PWT:    1.0 cm    0.6 - 1.1 cm  LVIDd:  5.7 cm    3.0 - 5.6 cm  LVIDs:  4.8 cm    1.8 - 4.0 cm  Derived Variables:  LVMI: 115 g/m2  RWT: 0.35  Fractional short: 16 %  Ejection Fraction (Visual Estimate): 35-40 %  ------------------------------------------------------------------------  OBSERVATIONS:  Mitral Valve: Normal mitral valve.  Aortic Root: Normal aortic root.  Aortic Valve: Normal trileaflet aortic valve.  Left Atrium: Normal left atrium.  Left Ventricle: Moderate global left ventricular systolic  dysfunction. Frequent ventricular ectopy noted during  study. Normal left ventricular internal dimensions and wall  thicknesses.  Right Heart: Normal right atrium. Normal right ventricular  size and function. Normal tricuspid valve. Normal pulmonic  valve. Mild pulmonic regurgitation.  Pericardium/PleuraNormal pericardium with no pericardial  effusion.  Hemodynamic: Inadequate tricuspid regurgitation Doppler  envelope precludes estimation of PASP.  ------------------------------------------------------------------------  CONCLUSIONS:  1. Normal left ventricular internal dimensions and wall  thicknesses.  2. Moderate global left ventricular systolic dysfunction.  Frequent ventricular ectopy noted during study.  3. Normal right ventricular size and function.  *** No previous Echo exam.  ------------------------------------------------------------------------  Confirmed on  2020 - 12:54:15 by Massimo Valdes MD, [facility],  [facility], [facility]  ------------------------------------------------------------------------    < end of copied text >      < from: Cardiac Treadmill Stress Test (Non Imaging) (20 @ 12:57) >    PATIENT: JOSEFINA TERAN  : 1970   AGE: 49 (M)   MR#: [id number]  STUDY DATE: 2020  LOCATION: O/P  REF. PHYSICIAN(S): Massimo Valdes MD  FELLOW: Koki Hernandez NP, NP  ------------------------------------------------------------------------  TYPE OF TEST: Exercise Stress Test  ------------------------------------------------------------------------  PROCEDURE:  Exercise Stress Test  ------------------------------------------------------------------------  HISTORY:  CARDIAC HISTORY: 49 year old man with  palpitations in  setting of COVID infection. He was started  beta blocker.  and referred for treadmill exercise stress test to assess  for arrhythmia. Echo done 20 noted reduced LVEF.  RISK FACTORS: Family History  MEDICATIONS: Entresto, Metoprolol Succinate, [place]  Extract, Vit D3  ------------------------------------------------------------------------  BASELINE ELECTROCARDIOGRAM:  Normal Sinus Rhythm - Frequent polymorphic VPD's  T wave abnormality in II , III, aVF, V6, inverted.  ------------------------------------------------------------------------  EXERCISE RESULTS:  TIME      SPEED    GRADE  HR      BP  Baseline  Standing   N/A  81  127/83  03:00     1.7 MPH    10% 116  142/88  06:00     2.5 MPH    12% 146  156/88  08:00     3.4 MPH    14% 164  166/90  02:00     Recovery       116  168/62  04:00     Recovery       107  151/54  06:00     Recovery       110  129/57  ------------------------------------------------------------------------  Protocol: Branden   ExerciseDuration: 8: 00 Min  HR: Baseline HR: 81 bpm   Peak HR: 164 bpm (97% of MPHR)  MPHR: 169 bpm   85% of MPHR: 144 bpm  BP: Baseline BP: 127/83 mmHg   Peak BP: 166/90 mmHg   Peak  RPP: [id number] (Rate Pressure Product)  Last Caffeine intake: 12 hrs  Terminated: Fatigue  Performance: 8 METS, Poor for age and gender.  Comments: The patient walked on treadmill, target heart  rate was achieved.  ------------------------------------------------------------------------  SYMPTOMS/FINDINGS:  Symptoms: no chest pain  Chest pain: No chest pain with exercise  ------------------------------------------------------------------------  ECG ABNORMALITIES DURING/AFTER STRESS:   Abnormalities: None  ------------------------------------------------------------------------  NEW ARRHYTHMIAS DEVELOPED DURING/AFTER STRESS:  Frequent polymorphic VPDs occurred during rest, stress and  recovery. Frequency of VPDs was unaffected by stress.  There was one 3-beat run VT at 2:50 recovery  ------------------------------------------------------------------------  STRESS TEST IMPRESSIONS:  Exercise capacity: 8 METS, Poor for age and gender. 97% of  MPHR.  Chest Pain: No chest pain with exercise.  Symptom: no chest pain.  HR Response: Appropriate.  BP Response: Appropriate.  Heart Rhythm: Normal Sinus Rhythm - Frequent polymorphic  VPD's.  Baseline ECG: T wave abnormality in II , III, aVF, V6,  inverted.  ECG Abnormalities: None.  Arrhythmia: Frequent polymorphic VPDs occurred during  rest, stress and recovery. Frequencyof VPDs was  unaffected by stress. There was one 3-beat run VT at 2:50  recovery.  Morel Score: 8  ------------------------------------------------------------------------  Confirmed on  2020 - 15:42:44 by Elder A. Salud,  M.D.  ------------------------------------------------------------------------    < end of copied text >        < from: CT Head No Cont (20 @ 12:00) >    EXAM:  CT BRAIN        PROCEDURE DATE:  2020         INTERPRETATION:  Clinical History: Headaches    Technique:  Multiple axial sections were acquired from the base of the skull to the vertex without contrast enhancement. Coronal and sagittal reconstructed images were performed as well.    Findings:  The lateral ventricles have a normal configuration.    There is no evidence of acute hemorrhage, mass or mass-effect in the posterior fossa or in the supratentorial region.    Evaluation of the osseous structures with the appropriate window appears unremarkable.    The visualized paranasal sinuses mastoid and middle regions appear clear.    Impression:  Unremarkable noncontrast head CT.        KATHI KEY M.D., ATTENDING RADIOLOGIST  This document has been electronically signed. 2020 12:10PM                  < end of copied text >

## 2020-07-07 NOTE — DISCHARGE NOTE PROVIDER - HOSPITAL COURSE
50 yo male PMHx HTN and Systolic HF (on Entresto) p/w chest pain x2days, admitted to tele for atypical chest pain, r/o ACS,  EKG- Old TWIs in II, III, aVF, V6. New TWIs in V4/V5.    cardiac enzymes trop<6. Pt had recent NST 5/20202- noted Frequent polymorphic VPDs occurred during rest, stress and recovery. Frequency of VPDs was unaffected by stress. There was one 3-beat run VT at 2:50 recovery    ECHO 5/2020- Moderate global left ventricular systolic dysfunction. EF of 35-40%     -C/w on Aspirin     - Cardiology Dr. Valdes consulted - Pt has had outpt work up for chest pain, known to have bigemini.  Pt has been approved for outpt MRI. No further cardiology workup at this time. Outpatient evaluation by EP.      -Tylenol PRN pain.              Chronic systolic heart failure    -Monitored on tele monitor    2G Sodium diet with 1500 cc Fluid restriction    Strict I&Os plus Daily weights.    c/w Entresto    Pt currently evolemic    Pro BNP- WNL.         Essential hypertension.  Plan: SBP within acceptable goal     Monitor BP daily    DASH diet    Continue Entresto    continue to monitor.         Hypokalemia.  Plan: Resolved    Replete PRN    C/o headaches- CT head - unremarkable.         Pt is medically stable for discharge, No further chest pain, Pt to f/u with Cardiology. 48 yo male PMHx HTN and Systolic HF (on Entresto) p/w chest pain x2days, admitted to tele for atypical chest pain, r/o ACS,  EKG- Old TWIs in II, III, aVF, V6. New TWIs in V4/V5.    cardiac enzymes trop<6. Pt had recent NST 5/20202- noted Frequent polymorphic VPDs occurred during rest, stress and recovery. Frequency of VPDs was unaffected by stress. There was one 3-beat run VT at 2:50 recovery    ECHO 5/2020- Moderate global left ventricular systolic dysfunction. EF of 35-40%     -C/w on Aspirin     - Cardiology Dr. Valdes consulted - Pt has had outpt work up for chest pain, known to have bigemini.  Pt has been approved for outpt MRI. No further cardiology workup at this time. Outpatient evaluation by EP.      -Tylenol PRN pain.     -Pt was evaluated by EP Dr. Tello, plans for outpatient f/u for further management.              Chronic systolic heart failure    -Monitored on tele monitor    2G Sodium diet with 1500 cc Fluid restriction    Strict I&Os plus Daily weights.    c/w Entresto    Pt currently evolemic    Pro BNP- WNL.         Essential hypertension.  Plan: SBP within acceptable goal     Monitor BP daily    DASH diet    Continue Entresto    continue to monitor.         Hypokalemia.  Plan: Resolved    Replete PRN    C/o headaches- CT head - unremarkable.         Pt is medically stable for discharge, No further chest pain, Pt to f/u with Cardiology.

## 2020-07-07 NOTE — DISCHARGE NOTE PROVIDER - NSDCCPCAREPLAN_GEN_ALL_CORE_FT
PRINCIPAL DISCHARGE DIAGNOSIS  Diagnosis: Chest pain  Assessment and Plan of Treatment: Continue Aspirin  follow up with Cardiology Dr. Valdes in his office in 1-2 weeks. Call to schedule an appointment  follow up for plans for cardiac MRI.      SECONDARY DISCHARGE DIAGNOSES  Diagnosis: Essential hypertension  Assessment and Plan of Treatment: continue Enresto   follow up with cardiology    Diagnosis: Chronic systolic heart failure  Assessment and Plan of Treatment: continue Entresto  follow up with cardiology    Diagnosis: Hypokalemia  Assessment and Plan of Treatment: resolved. PRINCIPAL DISCHARGE DIAGNOSIS  Diagnosis: Chest pain  Assessment and Plan of Treatment: Continue Aspirin  follow up with Cardiology Dr. Valdes in his office in 1-2 weeks. Call to schedule an appointment  follow up for plans for cardiac MRI.  Follow up ROB Dos Santos, you will be called with an appointment torri and time.  (office # [phone])      SECONDARY DISCHARGE DIAGNOSES  Diagnosis: Essential hypertension  Assessment and Plan of Treatment: continue Enresto   follow up with cardiology    Diagnosis: Chronic systolic heart failure  Assessment and Plan of Treatment: continue Entresto  follow up with cardiology    Diagnosis: Hypokalemia  Assessment and Plan of Treatment: resolved. PRINCIPAL DISCHARGE DIAGNOSIS  Diagnosis: Chest pain  Assessment and Plan of Treatment: Continue Aspirin  follow up with Cardiology Dr. Valdes in his office in 1-2 weeks. Call to schedule an appointment  follow up for plans for cardiac MRI.  Follow up ROB Dos Santos, you will be called with an appointment date and time.  (office # [phone])      SECONDARY DISCHARGE DIAGNOSES  Diagnosis: Essential hypertension  Assessment and Plan of Treatment: continue Enresto   follow up with cardiology    Diagnosis: Chronic systolic heart failure  Assessment and Plan of Treatment: continue Entresto  follow up with cardiology    Diagnosis: Hypokalemia  Assessment and Plan of Treatment: resolved.

## 2020-07-07 NOTE — DISCHARGE NOTE NURSING/CASE MANAGEMENT/SOCIAL WORK - PATIENT PORTAL LINK FT
You can access the FollowMyHealth Patient Portal offered by [facility] by registering at the following website: [email or web address]. By joining [employer]’s FollowMyHealth portal, you will also be able to view your health information using other applications (apps) compatible with our system.

## 2020-07-07 NOTE — PROGRESS NOTE ADULT - PROBLEM SELECTOR PLAN 6
1.  Name of PCP: Dr Lopez [phone], Dr Valdes   2.  PCP Contacted on Admission: [ X] Y    [ ] N   Attempted - no reply   3.  PCP contacted at Discharge: [ ] Y    [ ] N    [ ] N/A  4.  Post-Discharge Appointment Date and Location:  5.  Summary of Handoff given to PCP: 1.  Name of PCP: Dr Lopez [phone], Dr Valdes   2.  PCP Contacted on Admission: [ X] Y    [ ] N     3.  PCP contacted at Discharge: [ ] Y    [ ] N    [ ] N/A  4.  Post-Discharge Appointment Date and Location:  5.  Summary of Handoff given to PCP:

## 2020-07-07 NOTE — CONSULT NOTE ADULT - ASSESSMENT
BRIAN (had extensive outside cardiac evaluation at [facility] prior to seeing me in office for second opinion).  Likely attributed to HTN and frequent ectopy   EP evaluation  He feels better and wants to go home later today. No new cardiac testing needed at this time,  Cardiac MRI being arranged.  F/U in office 1-2 wks after discharge
49M with hx of HFrEF of unclear etiology (TTE 05/2020 with EF 35-40%, no valvular abnormalities), stress test from May 2020 negative, possibly related to myocarditis related to prior COVID-19 infection sent from urgent care for abnormal EKG. EKG on admission and telemetry revealed sinus rhythm with PVCs. He is started on Entresto. He is not on any AV rekha blockers. Discussed with Dr. wakefield  - Continue GDMT with Entresto  - Awaiting CMR as outpatient  - May D/c home from EP standpoint  - F/U with Dr. Wakefield as out patient

## 2020-07-09 ENCOUNTER — APPOINTMENT (OUTPATIENT)
Dept: CARDIOLOGY | Facility: CLINIC | Age: 50
End: 2020-07-09

## 2020-07-16 ENCOUNTER — APPOINTMENT (OUTPATIENT)
Dept: ELECTROPHYSIOLOGY | Facility: CLINIC | Age: 50
End: 2020-07-16
Payer: COMMERCIAL

## 2020-07-16 VITALS
HEART RATE: 49 BPM | DIASTOLIC BLOOD PRESSURE: 76 MMHG | SYSTOLIC BLOOD PRESSURE: 160 MMHG | BODY MASS INDEX: 22.15 KG/M2 | TEMPERATURE: 97.2 F | OXYGEN SATURATION: 99 % | HEIGHT: 69 IN

## 2020-07-16 VITALS — BODY MASS INDEX: 22.15 KG/M2 | WEIGHT: 150 LBS

## 2020-07-16 DIAGNOSIS — Z82.49 FAMILY HISTORY OF ISCHEMIC HEART DISEASE AND OTHER DISEASES OF THE CIRCULATORY SYSTEM: ICD-10-CM

## 2020-07-16 DIAGNOSIS — Z13.6 ENCOUNTER FOR SCREENING FOR CARDIOVASCULAR DISORDERS: ICD-10-CM

## 2020-07-16 PROCEDURE — 93000 ELECTROCARDIOGRAM COMPLETE: CPT

## 2020-07-16 PROCEDURE — 99215 OFFICE O/P EST HI 40 MIN: CPT

## 2020-07-16 RX ORDER — PROPRANOLOL HYDROCHLORIDE 10 MG/1
10 TABLET ORAL
Qty: 90 | Refills: 3 | Status: DISCONTINUED | COMMUNITY
Start: 2020-05-14 | End: 2020-07-16

## 2020-07-16 RX ORDER — METOPROLOL SUCCINATE 25 MG/1
25 TABLET, EXTENDED RELEASE ORAL DAILY
Refills: 11 | Status: DISCONTINUED | COMMUNITY
End: 2020-07-16

## 2020-07-16 NOTE — PHYSICAL EXAM
[General Appearance - Well Developed] : well developed [Normal Appearance] : normal appearance [Well Groomed] : well groomed [General Appearance - Well Nourished] : well nourished [No Deformities] : no deformities [General Appearance - In No Acute Distress] : no acute distress [Normal Conjunctiva] : the conjunctiva exhibited no abnormalities [Eyelids - No Xanthelasma] : the eyelids demonstrated no xanthelasmas [Normal Oral Mucosa] : normal oral mucosa [No Oral Pallor] : no oral pallor [No Oral Cyanosis] : no oral cyanosis [Normal Jugular Venous A Waves Present] : normal jugular venous A waves present [Normal Jugular Venous V Waves Present] : normal jugular venous V waves present [No Jugular Venous Vila A Waves] : no jugular venous vila A waves [Heart Rate And Rhythm] : heart rate and rhythm were normal [Heart Sounds] : normal S1 and S2 [Murmurs] : no murmurs present [Respiration, Rhythm And Depth] : normal respiratory rhythm and effort [Auscultation Breath Sounds / Voice Sounds] : lungs were clear to auscultation bilaterally [Exaggerated Use Of Accessory Muscles For Inspiration] : no accessory muscle use [Abdomen Tenderness] : non-tender [Abdomen Soft] : soft [Abdomen Mass (___ Cm)] : no abdominal mass palpated [Abnormal Walk] : normal gait [Gait - Sufficient For Exercise Testing] : the gait was sufficient for exercise testing [Nail Clubbing] : no clubbing of the fingernails [Petechial Hemorrhages (___cm)] : no petechial hemorrhages [Cyanosis, Localized] : no localized cyanosis [Skin Color & Pigmentation] : normal skin color and pigmentation [] : no rash [No Venous Stasis] : no venous stasis [Skin Lesions] : no skin lesions [No Skin Ulcers] : no skin ulcer [No Xanthoma] : no  xanthoma was observed [Oriented To Time, Place, And Person] : oriented to person, place, and time [Affect] : the affect was normal [Mood] : the mood was normal [No Anxiety] : not feeling anxious

## 2020-07-16 NOTE — DISCUSSION/SUMMARY
[FreeTextEntry1] : Duncan Garcia is a 48y/o man with Hx of HTN and NICM/moderate LV dysfunction in setting of frequent PVCs, on Entresto, who presents today for initial evaluation. \par \par Impression:\par \par 1. PVCs: EKG today shows NSR with frequent monomorphic PVCs, bigeminy pattern, likely originating near the tricuspid valve/parahisian. 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 Risks, benefits, and alternatives to procedure discussed at length. Risks including that of bleeding, infection, stroke, and cardiac tamponade discussed and he verbalizes understanding of all.\par \par 2. HTN: BP elevated in office resume oral antihypertensives as prescribed. Encouraged heart healthy diet, sodium restriction, and weight loss. Continue regular f/u with Cardiologist for further HTN management.\par \par 3. Moderate LV dysfunction: may be PVC induced. Resume Entresto as prescribed and will repeat ECHO post ablation. \par \par Plan for PVC ablation.

## 2020-07-16 NOTE — HISTORY OF PRESENT ILLNESS
[FreeTextEntry1] : Massimo Valdes MD\par \par Duncan Garcia is a 48y/o man with Hx of HTN and NICM/moderate LV dysfunction in setting of frequent PVCs, on Entresto, who presents today for initial evaluation. Was told he had PVCs about 5 years ago and ablation was discussed at that time but he chose to avoid. Admits feeling well. Denies chest pain, palpitations, SOB, syncope or near syncope. Was seen in the hospital back in 5/2020 for frequent PVCs, noted to be in bigeminy. Stress test performed and negative for ischemia. ECHO with moderate LV dysfunction, now on Entresto. Of note, was COVID positive in March 2020.

## 2020-07-19 ENCOUNTER — NON-APPOINTMENT (OUTPATIENT)
Age: 50
End: 2020-07-19

## 2020-07-29 ENCOUNTER — RESULT REVIEW (OUTPATIENT)
Age: 50
End: 2020-07-29

## 2020-07-29 ENCOUNTER — APPOINTMENT (OUTPATIENT)
Dept: MRI IMAGING | Facility: CLINIC | Age: 50
End: 2020-07-29
Payer: COMMERCIAL

## 2020-07-29 ENCOUNTER — OUTPATIENT (OUTPATIENT)
Dept: OUTPATIENT SERVICES | Facility: HOSPITAL | Age: 50
LOS: 1 days | End: 2020-07-29
Payer: COMMERCIAL

## 2020-07-29 DIAGNOSIS — I50.9 HEART FAILURE, UNSPECIFIED: ICD-10-CM

## 2020-07-29 DIAGNOSIS — R00.2 PALPITATIONS: ICD-10-CM

## 2020-07-29 DIAGNOSIS — I49.3 VENTRICULAR PREMATURE DEPOLARIZATION: ICD-10-CM

## 2020-07-29 DIAGNOSIS — I42.8 OTHER CARDIOMYOPATHIES: ICD-10-CM

## 2020-07-29 PROCEDURE — 75561 CARDIAC MRI FOR MORPH W/DYE: CPT | Mod: 26

## 2020-07-29 PROCEDURE — 75561 CARDIAC MRI FOR MORPH W/DYE: CPT

## 2020-07-29 PROCEDURE — A9585: CPT

## 2020-08-06 ENCOUNTER — APPOINTMENT (OUTPATIENT)
Dept: ELECTROPHYSIOLOGY | Facility: CLINIC | Age: 50
End: 2020-08-06
Payer: COMMERCIAL

## 2020-08-06 ENCOUNTER — NON-APPOINTMENT (OUTPATIENT)
Age: 50
End: 2020-08-06

## 2020-08-06 ENCOUNTER — APPOINTMENT (OUTPATIENT)
Dept: CARDIOLOGY | Facility: CLINIC | Age: 50
End: 2020-08-06
Payer: COMMERCIAL

## 2020-08-06 VITALS
HEART RATE: 80 BPM | WEIGHT: 152 LBS | BODY MASS INDEX: 22.51 KG/M2 | HEIGHT: 69 IN | DIASTOLIC BLOOD PRESSURE: 65 MMHG | SYSTOLIC BLOOD PRESSURE: 134 MMHG | OXYGEN SATURATION: 99 % | TEMPERATURE: 96.9 F

## 2020-08-06 DIAGNOSIS — Z00.00 ENCOUNTER FOR GENERAL ADULT MEDICAL EXAMINATION W/OUT ABNORMAL FINDINGS: ICD-10-CM

## 2020-08-06 PROCEDURE — 99214 OFFICE O/P EST MOD 30 MIN: CPT

## 2020-08-06 PROCEDURE — 99215 OFFICE O/P EST HI 40 MIN: CPT

## 2020-08-06 PROCEDURE — 93000 ELECTROCARDIOGRAM COMPLETE: CPT

## 2020-08-06 NOTE — HISTORY OF PRESENT ILLNESS
[FreeTextEntry1] : Massimo Valdes MD\par \par Duncan Garcia is a 50y/o man with Hx of HTN and NICM/moderate LV dysfunction in setting of frequent PVCs, on Entresto, who presents today for routine f/u. Was told he had PVCs about 5 years ago and ablation was discussed at that time but he chose to avoid. Admits feeling well. Denies chest pain, palpitations, SOB, syncope or near syncope. Was seen in the hospital back in 5/2020 for frequent PVCs, noted to be in bigeminy. Stress test performed and negative for ischemia. ECHO with moderate LV dysfunction, now on Entresto. Of note, was COVID positive in March 2020. Now Cardiac MRI with no LGE, LVEF 38%.

## 2020-08-06 NOTE — PHYSICAL EXAM
[General Appearance - Well Developed] : well developed [Normal Appearance] : normal appearance [Well Groomed] : well groomed [General Appearance - Well Nourished] : well nourished [No Deformities] : no deformities [General Appearance - In No Acute Distress] : no acute distress [Normal Conjunctiva] : the conjunctiva exhibited no abnormalities [Eyelids - No Xanthelasma] : the eyelids demonstrated no xanthelasmas [No Oral Pallor] : no oral pallor [Normal Oral Mucosa] : normal oral mucosa [No Oral Cyanosis] : no oral cyanosis [Normal Jugular Venous A Waves Present] : normal jugular venous A waves present [No Jugular Venous Vila A Waves] : no jugular venous vila A waves [Normal Jugular Venous V Waves Present] : normal jugular venous V waves present [Respiration, Rhythm And Depth] : normal respiratory rhythm and effort [Exaggerated Use Of Accessory Muscles For Inspiration] : no accessory muscle use [Auscultation Breath Sounds / Voice Sounds] : lungs were clear to auscultation bilaterally [Heart Rate And Rhythm] : heart rate and rhythm were normal [Heart Sounds] : normal S1 and S2 [Murmurs] : no murmurs present [Abdomen Tenderness] : non-tender [Abdomen Soft] : soft [Abdomen Mass (___ Cm)] : no abdominal mass palpated [Abnormal Walk] : normal gait [Gait - Sufficient For Exercise Testing] : the gait was sufficient for exercise testing [Nail Clubbing] : no clubbing of the fingernails [Cyanosis, Localized] : no localized cyanosis [Petechial Hemorrhages (___cm)] : no petechial hemorrhages [Skin Color & Pigmentation] : normal skin color and pigmentation [] : no rash [No Venous Stasis] : no venous stasis [Skin Lesions] : no skin lesions [No Xanthoma] : no  xanthoma was observed [No Skin Ulcers] : no skin ulcer [Oriented To Time, Place, And Person] : oriented to person, place, and time [Affect] : the affect was normal [Mood] : the mood was normal [No Anxiety] : not feeling anxious

## 2020-08-09 PROBLEM — Z00.00 ENCOUNTER FOR PREVENTIVE HEALTH EXAMINATION: Status: ACTIVE | Noted: 2020-05-14

## 2020-08-13 PROBLEM — I10 ESSENTIAL (PRIMARY) HYPERTENSION: Chronic | Status: ACTIVE | Noted: 2020-07-06

## 2020-08-13 PROBLEM — I50.22 CHRONIC SYSTOLIC (CONGESTIVE) HEART FAILURE: Chronic | Status: ACTIVE | Noted: 2020-07-06

## 2020-08-17 ENCOUNTER — APPOINTMENT (OUTPATIENT)
Dept: DISASTER EMERGENCY | Facility: CLINIC | Age: 50
End: 2020-08-17

## 2020-08-17 ENCOUNTER — APPOINTMENT (OUTPATIENT)
Dept: ELECTROPHYSIOLOGY | Facility: CLINIC | Age: 50
End: 2020-08-17
Payer: COMMERCIAL

## 2020-08-17 DIAGNOSIS — Z01.818 ENCOUNTER FOR OTHER PREPROCEDURAL EXAMINATION: ICD-10-CM

## 2020-08-17 PROCEDURE — 99213 OFFICE O/P EST LOW 20 MIN: CPT | Mod: 95

## 2020-08-17 NOTE — PHYSICAL EXAM
[General Appearance - Well Developed] : well developed [Well Groomed] : well groomed [Normal Appearance] : normal appearance [General Appearance - Well Nourished] : well nourished [General Appearance - In No Acute Distress] : no acute distress [No Deformities] : no deformities [FreeTextEntry1] : Visit performed via TELE HEALTH, unable to complete physical exam

## 2020-08-17 NOTE — HISTORY OF PRESENT ILLNESS
[FreeTextEntry1] : Massimo Valdes MD\par \par Duncan Garcia is a 48y/o man with Hx of HTN and NICM/moderate LV dysfunction in setting of frequent PVCs, on Entresto, who presents today for routine f/u. Was told he had PVCs about 5 years ago and ablation was discussed at that time but he chose to avoid. Admits feeling well. Denies chest pain, palpitations, SOB, syncope or near syncope. Was seen in the hospital back in 5/2020 for frequent PVCs, noted to be in bigeminy. Stress test performed and negative for ischemia. ECHO with moderate LV dysfunction, now on Entresto. Of note, was COVID positive in March 2020. Now Cardiac MRI with no LGE, LVEF 38%.

## 2020-08-17 NOTE — DISCUSSION/SUMMARY
[FreeTextEntry1] : Duncan Garcia is a 50y/o man with Hx of HTN and NICM/moderate LV dysfunction in setting of frequent PVCs, on Entresto, who presents today for routine f/u. \par \par Impression:\par \par 1. 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 Risks, benefits, and alternatives to procedure discussed at length. Risks including that of bleeding, infection, stroke, and cardiac tamponade discussed and he verbalizes understanding of all.\par \par 2. HTN: Encouraged heart healthy diet, sodium restriction, and weight loss. Continue regular f/u with Cardiologist for further HTN management.\par \par 3. Moderate LV dysfunction: may be PVC induced. Resume Entresto as prescribed and will repeat ECHO post ablation. \par \par Plan for PVC ablation as scheduled. \par \par This TELEHealth visit required approximately 20 minutes in total over the telephone.

## 2020-08-17 NOTE — REASON FOR VISIT
[Initial Evaluation] : an initial evaluation of [Home] : at home, [unfilled] , at the time of the visit. [Medical Office: ([facility])___] : at the medical office located in  [FreeTextEntry1] : PVCs

## 2020-08-18 LAB — SARS-COV-2 N GENE NPH QL NAA+PROBE: NOT DETECTED

## 2020-08-19 ENCOUNTER — OUTPATIENT (OUTPATIENT)
Dept: OUTPATIENT SERVICES | Facility: HOSPITAL | Age: 50
LOS: 1 days | Discharge: HOME | End: 2020-08-19
Payer: COMMERCIAL

## 2020-08-19 DIAGNOSIS — I49.3 VENTRICULAR PREMATURE DEPOLARIZATION: ICD-10-CM

## 2020-08-19 LAB
ANION GAP SERPL CALC-SCNC: 14 MMO/L — SIGNIFICANT CHANGE UP (ref 7–14)
BLD GP AB SCN SERPL QL: NEGATIVE — SIGNIFICANT CHANGE UP
BUN SERPL-MCNC: 16 MG/DL — SIGNIFICANT CHANGE UP (ref 7–23)
CALCIUM SERPL-MCNC: 9.6 MG/DL — SIGNIFICANT CHANGE UP (ref 8.4–10.5)
CHLORIDE SERPL-SCNC: 102 MMOL/L — SIGNIFICANT CHANGE UP (ref 98–107)
CO2 SERPL-SCNC: 27 MMOL/L — SIGNIFICANT CHANGE UP (ref 22–31)
CREAT SERPL-MCNC: 1.26 MG/DL — SIGNIFICANT CHANGE UP (ref 0.5–1.3)
GLUCOSE SERPL-MCNC: 105 MG/DL — HIGH (ref 70–99)
HCT VFR BLD CALC: 44.1 % — SIGNIFICANT CHANGE UP (ref 39–50)
HGB BLD-MCNC: 15.1 G/DL — SIGNIFICANT CHANGE UP (ref 13–17)
MCHC RBC-ENTMCNC: 31.7 PG — SIGNIFICANT CHANGE UP (ref 27–34)
MCHC RBC-ENTMCNC: 34.2 % — SIGNIFICANT CHANGE UP (ref 32–36)
MCV RBC AUTO: 92.6 FL — SIGNIFICANT CHANGE UP (ref 80–100)
NRBC # FLD: 0 K/UL — SIGNIFICANT CHANGE UP (ref 0–0)
PLATELET # BLD AUTO: 208 K/UL — SIGNIFICANT CHANGE UP (ref 150–400)
PMV BLD: 10.3 FL — SIGNIFICANT CHANGE UP (ref 7–13)
POTASSIUM SERPL-MCNC: 3.5 MMOL/L — SIGNIFICANT CHANGE UP (ref 3.5–5.3)
POTASSIUM SERPL-SCNC: 3.5 MMOL/L — SIGNIFICANT CHANGE UP (ref 3.5–5.3)
RBC # BLD: 4.76 M/UL — SIGNIFICANT CHANGE UP (ref 4.2–5.8)
RBC # FLD: 12.3 % — SIGNIFICANT CHANGE UP (ref 10.3–14.5)
RH IG SCN BLD-IMP: POSITIVE — SIGNIFICANT CHANGE UP
RH IG SCN BLD-IMP: POSITIVE — SIGNIFICANT CHANGE UP
SODIUM SERPL-SCNC: 143 MMOL/L — SIGNIFICANT CHANGE UP (ref 135–145)
WBC # BLD: 3.73 K/UL — LOW (ref 3.8–10.5)
WBC # FLD AUTO: 3.73 K/UL — LOW (ref 3.8–10.5)

## 2020-08-19 PROCEDURE — 93010 ELECTROCARDIOGRAM REPORT: CPT

## 2020-08-19 PROCEDURE — ZZZZZ: CPT

## 2020-08-19 PROCEDURE — 93662 INTRACARDIAC ECG (ICE): CPT | Mod: 26

## 2020-08-19 PROCEDURE — 93654 COMPRE EP EVAL TX VT: CPT

## 2020-08-19 RX ORDER — CHOLECALCIFEROL (VITAMIN D3) 125 MCG
1 CAPSULE ORAL
Qty: 0 | Refills: 0 | DISCHARGE

## 2020-08-19 RX ORDER — CLOTRIMAZOLE AND BETAMETHASONE DIPROPIONATE 10; .5 MG/G; MG/G
1 CREAM TOPICAL
Qty: 0 | Refills: 0 | DISCHARGE

## 2020-08-19 RX ORDER — SACUBITRIL AND VALSARTAN 24; 26 MG/1; MG/1
1 TABLET, FILM COATED ORAL
Qty: 0 | Refills: 0 | DISCHARGE

## 2020-08-19 RX ORDER — ZINC SULFATE TAB 220 MG (50 MG ZINC EQUIVALENT) 220 (50 ZN) MG
1 TAB ORAL
Qty: 0 | Refills: 0 | DISCHARGE

## 2020-08-19 RX ORDER — SODIUM CHLORIDE 9 MG/ML
3 INJECTION INTRAMUSCULAR; INTRAVENOUS; SUBCUTANEOUS EVERY 8 HOURS
Refills: 0 | Status: DISCONTINUED | OUTPATIENT
Start: 2020-08-19 | End: 2020-09-03

## 2020-08-19 RX ORDER — ASCORBIC ACID 60 MG
1 TABLET,CHEWABLE ORAL
Qty: 0 | Refills: 0 | DISCHARGE

## 2020-08-19 RX ADMIN — SODIUM CHLORIDE 3 MILLILITER(S): 9 INJECTION INTRAMUSCULAR; INTRAVENOUS; SUBCUTANEOUS at 15:07

## 2020-08-19 NOTE — CHART NOTE - NSCHARTNOTEFT_GEN_A_CORE
This is a 49 year old man with a pmhx of HTN, NICM (EF 38%), and asymptomatic PVC who presented on 8/19/2020 for an elective PVC ablation s/p ablation     Physical Exam:  General: Alert, NAD, lying in bed  Lung: CTABL no wheezing appreciated  CVS: S1 S2 RRR no rubs, murmur appreciated  Abdomen: +BS, soft NTND   Right groin was covered with a  clear with a gauge and clear Tegaderm. It was clean, dry, and intact. No bleeding, drainage hematoma, or ecchymosis appreciated.    Pulses: +2DP/PT    Plan:  -Post- ablation instruction has been verbal explain and given to the patient. Patient expressed understanding and all questions were answered   - may shower upon arrival to home, otherwise keep groin incision sites dry and clean.    - Avoid activities such as jogging/excessive stair climbing/weight lifting for the next 5 days    - Take Acetaminophen (Tylenol) 500mg, one to two tablets every 8 hours as needed for pain relief.    - Pt was instructed to call [phone] if the following occurs:      - fever with temperature > 100.6      - swelling or bleeding at the groin incision site(s)  - Outpatient F/U is scheduled with Dr. Tello on 10/1/2020 at 930am    All questions answered to patient's satisfaction.  Follow up letter and instructions left in the care of the patient.      Will discuss with EP attending; addendum to follow.

## 2020-08-19 NOTE — CHART NOTE - NSCHARTNOTEFT_GEN_A_CORE
Type of Procedure: PVC ablation  Licensed independent practitioner: Merna Tello MD  Assistant: None  Description of procedure:   After informed consent was obtained, the patient was brought to the Electrophysiology laboratory in the  fasting state, and was prepped and draped in the usual sterile fashion. Specifically, both groins were  prepped in anticipation of vascular access.  Sheaths were placed in the RFV as described above catheters were advanced  into the heart  without complications.  Baseline intra-cardiac echocardiography (ICE) demonstrated the following:  * The LV size and function was abnormal (40%)  * The RV was normal, with normal systolic function (50%)  * There was no pericardial effusion at baseline  The patient presented in sinus rhythm. Spontaneous PVCs were present. The PVC morphology was  LBLS axis with a transition in V6 later than the sinus transition.  A Deca Sudheer F-curve mapping catheter and a 3.5mm open irrigated-tip Thermo[employer] SmartTouch  D-F curve ablation catheter were used for mapping and ablation at different stages during the procedure.  Three-dimensional anatomy, as well as voltage and activation maps of the RV were created  using [employer] 3 V7.  Activation mapping of the lateral tricuspid valve and basal lateral RV revealed an early activation (-25ms). Pacemapping  fromthis area revealed a close match to the clinical PVC. Ablation here diminished  the PVC burden. However, it required high power to penetrate given likely epicardial origin. After a 30 min waiting period, no further PVCs were present.  No effusion on ICE. All catheters are sheaths were removed. Manual pressure was held until hemostasis was achieved.  The patient was transferred back to [facility].  Findings of procedure: As above  Estimated blood loss: <10cc  Specimen removed: N/A  Preoperative Dx: PVC  Postoperative Dx: PVC  Complications: None  Anesthesia type: Conscious Type of Procedure: PVC ablation  Licensed independent practitioner: Merna Tello MD  Assistant: None  Description of procedure:   After informed consent was obtained, the patient was brought to the Electrophysiology laboratory in the  fasting state, and was prepped and draped in the usual sterile fashion. Specifically, both groins were  prepped in anticipation of vascular access.  Sheaths were placed in the RFV as described above catheters were advanced  into the heart  without complications.  Baseline intra-cardiac echocardiography (ICE) demonstrated the following:  * The LV size and function was abnormal (40%)  * The RV was normal, with normal systolic function (50%)  * There was no pericardial effusion at baseline  The patient presented in sinus rhythm. Spontaneous PVCs were present. The PVC morphology was  LBLS axis with a transition in V6 later than the sinus transition.  A Deca Sudheer F-curve mapping catheter and a 3.5mm open irrigated-tip Thermo[employer] SmartTouch  D-F curve ablation catheter were used for mapping and ablation at different stages during the procedure.  Three-dimensional anatomy, as well as voltage and activation maps of the RV were created  using [employer] 3 V7.  Activation mapping of the lateral tricuspid valve and basal lateral RV revealed an early activation (-25ms). Pacemapping  fromthis area revealed a close match to the clinical PVC. Ablation here diminished  the PVC burden. However, it required high power to penetrate given likely epicardial origin. After a 30 min waiting period, no further PVCs were present.  No effusion on ICE. All catheters are sheaths were removed. Manual pressure was held until hemostasis was achieved.  The patient was transferred back to [facility].  During the procedure, a [employer] rep was present to manage a complex mapping system.  Findings of procedure: As above  Estimated blood loss: <10cc  Specimen removed: N/A  Preoperative Dx: PVC  Postoperative Dx: PVC  Complications: None  Anesthesia type: Conscious

## 2020-08-19 NOTE — H&P CARDIOLOGY - REVIEW OF SYSTEMS
as per HPI, hyperpigmentation of scrotum for which he uses his cream for  all other systems negative

## 2020-08-19 NOTE — H&P CARDIOLOGY - HISTORY OF PRESENT ILLNESS
49 year old male with HTN, NICM in setting frequent PVC's without complaints who presents for PVC ablation.     please see hard copy H&P in paper chart 8/6/2020 and 8/17/2020  PATIENT SEEN AND EXAMINED AND NO NEW CLINICAL CHANGES SINCE office visit

## 2020-08-25 ENCOUNTER — APPOINTMENT (OUTPATIENT)
Dept: CARDIOLOGY | Facility: CLINIC | Age: 50
End: 2020-08-25
Payer: COMMERCIAL

## 2020-08-25 PROCEDURE — 99214 OFFICE O/P EST MOD 30 MIN: CPT | Mod: 95

## 2020-08-30 NOTE — REVIEW OF SYSTEMS
[Feeling Fatigued] : feeling fatigued [Palpitations] : palpitations [Anxiety] : anxiety [Negative] : Heme/Lymph

## 2020-08-30 NOTE — PHYSICAL EXAM
[General Appearance - Well Developed] : well developed [Normal Appearance] : normal appearance [Well Groomed] : well groomed [General Appearance - Well Nourished] : well nourished [No Deformities] : no deformities [General Appearance - In No Acute Distress] : no acute distress [Normal Conjunctiva] : the conjunctiva exhibited no abnormalities [Eyelids - No Xanthelasma] : the eyelids demonstrated no xanthelasmas [Normal Oral Mucosa] : normal oral mucosa [No Oral Pallor] : no oral pallor [No Oral Cyanosis] : no oral cyanosis [Normal Jugular Venous A Waves Present] : normal jugular venous A waves present [Normal Jugular Venous V Waves Present] : normal jugular venous V waves present [No Jugular Venous Vila A Waves] : no jugular venous vila A waves [Respiration, Rhythm And Depth] : normal respiratory rhythm and effort [Exaggerated Use Of Accessory Muscles For Inspiration] : no accessory muscle use [Auscultation Breath Sounds / Voice Sounds] : lungs were clear to auscultation bilaterally [Heart Rate And Rhythm] : heart rate and rhythm were normal [Heart Sounds] : normal S1 and S2 [Murmurs] : no murmurs present [Abdomen Soft] : soft [Abdomen Tenderness] : non-tender [Abdomen Mass (___ Cm)] : no abdominal mass palpated [Abnormal Walk] : normal gait [Gait - Sufficient For Exercise Testing] : the gait was sufficient for exercise testing [Nail Clubbing] : no clubbing of the fingernails [Cyanosis, Localized] : no localized cyanosis [Petechial Hemorrhages (___cm)] : no petechial hemorrhages [Skin Color & Pigmentation] : normal skin color and pigmentation [] : no rash [No Venous Stasis] : no venous stasis [Skin Lesions] : no skin lesions [No Skin Ulcers] : no skin ulcer [No Xanthoma] : no  xanthoma was observed [Oriented To Time, Place, And Person] : oriented to person, place, and time [Affect] : the affect was normal [Mood] : the mood was normal [No Anxiety] : not feeling anxious

## 2020-08-30 NOTE — REASON FOR VISIT
[FreeTextEntry1] : Mr. Garcia returns for follow-up evaluation via Telemedicine after recent PVC ablation which was deemed a success by his EP.  .\par \par He underwent cardiac MRI  on 7/29/20 which did not show any significant findings.  He has scheduled PVC ablation with Dr. Tello.  He otherwise reports that his symptoms have remained under good control.  No current exertional limitations.  Reports compliance with medications.  12-lead ECG for history of palpitations and PVCs notes SR, normal axis, normal intervals.  No new recommendations.  Anticipation is some recovery in LV systolic function after reducing PVC burden following ablation. Currently on appropriate medications. F/U after ablation.\par \par PMH:\par He is a 48 yo man with no significant PMH until recently when he was afflicted with COVID19 with initially mild respiratory symptoms.  He then subsequently developed progressively worsening palpitations and substernal to left sided chest discomfort with radiation to left arm, shoulder, and back.  He went to a cardiologist and internist affiliated with [facility].  Workup reportedly included an echocardiogram and 2-day event monitoring.  Unclear findings as we have no reports, but patient thinks there was no remarkable findings.  The patient was advised to take metoprolol for his symptoms of palpitations, but patient was anxious to start therapy as he states he has baseline sinus bradycardia with HR sometimes as low as 40s-50s bpm, particularly at night when he sleeps.  He reports sometimes feeling the need to jump out of bed to do pushups when he thinks his HR is too low.  He has no other symptoms, denying dyspnea, orthopnea/PND, syncope/presyncope, dizziness.   \par \par However, the patient's symptoms progressively worsened.  He notes having worsening palpitations.  He has not taken any medications including the beta blocker.\par \par Prior 12-lead ECG was of poor baseline, but suggested underlying sinus rhythm with frequent PVCs.  Physical examination was notable for an anxious man, but otherwise unremarkable cardiovascular examination.\par

## 2020-09-10 ENCOUNTER — EMERGENCY (EMERGENCY)
Facility: HOSPITAL | Age: 50
LOS: 1 days | Discharge: HOME | End: 2020-09-10
Attending: EMERGENCY MEDICINE | Admitting: EMERGENCY MEDICINE
Payer: COMMERCIAL

## 2020-09-10 ENCOUNTER — NON-APPOINTMENT (OUTPATIENT)
Age: 50
End: 2020-09-10

## 2020-09-10 ENCOUNTER — APPOINTMENT (OUTPATIENT)
Dept: CARDIOLOGY | Facility: CLINIC | Age: 50
End: 2020-09-10
Payer: COMMERCIAL

## 2020-09-10 VITALS
DIASTOLIC BLOOD PRESSURE: 93 MMHG | SYSTOLIC BLOOD PRESSURE: 148 MMHG | RESPIRATION RATE: 16 BRPM | HEART RATE: 81 BPM | TEMPERATURE: 98 F | OXYGEN SATURATION: 100 %

## 2020-09-10 VITALS
DIASTOLIC BLOOD PRESSURE: 84 MMHG | TEMPERATURE: 97.8 F | HEART RATE: 83 BPM | SYSTOLIC BLOOD PRESSURE: 144 MMHG | HEIGHT: 69 IN | OXYGEN SATURATION: 98 %

## 2020-09-10 VITALS
OXYGEN SATURATION: 100 % | DIASTOLIC BLOOD PRESSURE: 90 MMHG | RESPIRATION RATE: 19 BRPM | SYSTOLIC BLOOD PRESSURE: 138 MMHG | HEART RATE: 70 BPM

## 2020-09-10 VITALS — WEIGHT: 153 LBS | BODY MASS INDEX: 22.59 KG/M2

## 2020-09-10 LAB
ALBUMIN SERPL ELPH-MCNC: 4.9 G/DL — SIGNIFICANT CHANGE UP (ref 3.3–5)
ALP SERPL-CCNC: 58 U/L — SIGNIFICANT CHANGE UP (ref 40–120)
ALT FLD-CCNC: 13 U/L — SIGNIFICANT CHANGE UP (ref 4–41)
ANION GAP SERPL CALC-SCNC: 13 MMO/L — SIGNIFICANT CHANGE UP (ref 7–14)
AST SERPL-CCNC: 17 U/L — SIGNIFICANT CHANGE UP (ref 4–40)
BASOPHILS # BLD AUTO: 0.02 K/UL — SIGNIFICANT CHANGE UP (ref 0–0.2)
BASOPHILS NFR BLD AUTO: 0.4 % — SIGNIFICANT CHANGE UP (ref 0–2)
BILIRUB SERPL-MCNC: 0.4 MG/DL — SIGNIFICANT CHANGE UP (ref 0.2–1.2)
BUN SERPL-MCNC: 12 MG/DL — SIGNIFICANT CHANGE UP (ref 7–23)
CALCIUM SERPL-MCNC: 9.6 MG/DL — SIGNIFICANT CHANGE UP (ref 8.4–10.5)
CHLORIDE SERPL-SCNC: 103 MMOL/L — SIGNIFICANT CHANGE UP (ref 98–107)
CO2 SERPL-SCNC: 27 MMOL/L — SIGNIFICANT CHANGE UP (ref 22–31)
CREAT SERPL-MCNC: 1.19 MG/DL — SIGNIFICANT CHANGE UP (ref 0.5–1.3)
EOSINOPHIL # BLD AUTO: 0.07 K/UL — SIGNIFICANT CHANGE UP (ref 0–0.5)
EOSINOPHIL NFR BLD AUTO: 1.6 % — SIGNIFICANT CHANGE UP (ref 0–6)
GLUCOSE SERPL-MCNC: 104 MG/DL — HIGH (ref 70–99)
HCT VFR BLD CALC: 41.3 % — SIGNIFICANT CHANGE UP (ref 39–50)
HGB BLD-MCNC: 13.7 G/DL — SIGNIFICANT CHANGE UP (ref 13–17)
HIV COMBO RESULT: SIGNIFICANT CHANGE UP
HIV1+2 AB SPEC QL: SIGNIFICANT CHANGE UP
IMM GRANULOCYTES NFR BLD AUTO: 0.2 % — SIGNIFICANT CHANGE UP (ref 0–1.5)
LYMPHOCYTES # BLD AUTO: 1.51 K/UL — SIGNIFICANT CHANGE UP (ref 1–3.3)
LYMPHOCYTES # BLD AUTO: 33.7 % — SIGNIFICANT CHANGE UP (ref 13–44)
MAGNESIUM SERPL-MCNC: 2.3 MG/DL — SIGNIFICANT CHANGE UP (ref 1.6–2.6)
MCHC RBC-ENTMCNC: 30 PG — SIGNIFICANT CHANGE UP (ref 27–34)
MCHC RBC-ENTMCNC: 33.2 % — SIGNIFICANT CHANGE UP (ref 32–36)
MCV RBC AUTO: 90.6 FL — SIGNIFICANT CHANGE UP (ref 80–100)
MONOCYTES # BLD AUTO: 0.38 K/UL — SIGNIFICANT CHANGE UP (ref 0–0.9)
MONOCYTES NFR BLD AUTO: 8.5 % — SIGNIFICANT CHANGE UP (ref 2–14)
NEUTROPHILS # BLD AUTO: 2.49 K/UL — SIGNIFICANT CHANGE UP (ref 1.8–7.4)
NEUTROPHILS NFR BLD AUTO: 55.6 % — SIGNIFICANT CHANGE UP (ref 43–77)
NRBC # FLD: 0 K/UL — SIGNIFICANT CHANGE UP (ref 0–0)
PHOSPHATE SERPL-MCNC: 3.4 MG/DL — SIGNIFICANT CHANGE UP (ref 2.5–4.5)
PLATELET # BLD AUTO: 242 K/UL — SIGNIFICANT CHANGE UP (ref 150–400)
PMV BLD: 9.8 FL — SIGNIFICANT CHANGE UP (ref 7–13)
POTASSIUM SERPL-MCNC: 3.6 MMOL/L — SIGNIFICANT CHANGE UP (ref 3.5–5.3)
POTASSIUM SERPL-SCNC: 3.6 MMOL/L — SIGNIFICANT CHANGE UP (ref 3.5–5.3)
PROT SERPL-MCNC: 7.9 G/DL — SIGNIFICANT CHANGE UP (ref 6–8.3)
RBC # BLD: 4.56 M/UL — SIGNIFICANT CHANGE UP (ref 4.2–5.8)
RBC # FLD: 11.9 % — SIGNIFICANT CHANGE UP (ref 10.3–14.5)
SODIUM SERPL-SCNC: 143 MMOL/L — SIGNIFICANT CHANGE UP (ref 135–145)
TROPONIN T, HIGH SENSITIVITY: 10 NG/L — SIGNIFICANT CHANGE UP (ref ?–14)
TROPONIN T, HIGH SENSITIVITY: 11 NG/L — SIGNIFICANT CHANGE UP (ref ?–14)
TSH SERPL-MCNC: 2.32 UIU/ML — SIGNIFICANT CHANGE UP (ref 0.27–4.2)
WBC # BLD: 4.48 K/UL — SIGNIFICANT CHANGE UP (ref 3.8–10.5)
WBC # FLD AUTO: 4.48 K/UL — SIGNIFICANT CHANGE UP (ref 3.8–10.5)

## 2020-09-10 PROCEDURE — 99284 EMERGENCY DEPT VISIT MOD MDM: CPT | Mod: 25

## 2020-09-10 PROCEDURE — 99215 OFFICE O/P EST HI 40 MIN: CPT

## 2020-09-10 PROCEDURE — 71046 X-RAY EXAM CHEST 2 VIEWS: CPT | Mod: 26

## 2020-09-10 PROCEDURE — 93000 ELECTROCARDIOGRAM COMPLETE: CPT

## 2020-09-10 PROCEDURE — 93010 ELECTROCARDIOGRAM REPORT: CPT | Mod: 59

## 2020-09-10 NOTE — ED PROVIDER NOTE - PROGRESS NOTE DETAILS
Megan Schaffer, resident MD: negative delta trop. spoke with Dr. Valdes and discussed case and will discharge with close outpt f/u. printed out copies of results for patient to take home. discussed return precautions and need for outpatient follow up. Pt without symptoms at present- spoke with cardiology [id number] and Dr. Valdes who agree- Dr. Valdes will followup him in the office- in ED no tachycardia or symptoms.

## 2020-09-10 NOTE — ED PROVIDER NOTE - OBJECTIVE STATEMENT
48 yo M with HTN, Systolic HF on Entresto, s/p ablation Aug 19, 2020 and recent Stress and ECHO that presents with palpitations this AM. pt reports 2 days ago had similar symptoms, was at rest, felt palpitations for a few seconds started having diaphoresis but at that time had no SOB or chest pain, self resolved symptoms. Had no issues yday but this morning was lying in bed, unsure what woke him up but no chest pain, palpitations or SOB but put on his pulse ox and found to have HR in 120s but normal saturation, stood up and his HR normalized but he grew concerned and came to ED. He was recently started on Fluconazole and some antifungal cream by his PMD and saw that the Side Effects could be low potassium and magnesium so he came to get this checked. Also started on Keflex for unknown issue but hasn't started it yet. Non smoker, no drinking, no drugs. No recent illnesses otherwise and has been eating and drinking normally. No recent travel or trauma.

## 2020-09-10 NOTE — ED ADULT NURSE NOTE - OBJECTIVE STATEMENT
49 yr old male pt presents to ED for evaluation of palpitations. pt states his symptoms started this morning when he woke up he felt like his "heart was racing" and found his heart rate to be 120 on his pulse oximeter device, states he has had similar episode x2 days ago but resolved on its own. pt also mentions having intermittent non radiating left sided dull chest pain. pt endorses having a cardiac ablation done in Aug. pt placed on bedside monitor, evaluated by provider, lab work collected, iv established 20 g to R ac.

## 2020-09-10 NOTE — ED ADULT TRIAGE NOTE - CHIEF COMPLAINT QUOTE
pt c/o palpitations since this morning states HR on pulse oximeter at home was 120's. s/p ablation for PVCs on 8/19/20. denies CP or SOB.

## 2020-09-10 NOTE — ED PROVIDER NOTE - CLINICAL SUMMARY MEDICAL DECISION MAKING FREE TEXT BOX
50 yo M with HTN, Systolic HF on Entresto, s/p ablation Aug 19, 2020 and recent Stress and ECHO that presents with palpitations this AM. Pt reports he only knew he had palpitations due to his pulse ox reading and didn't actually feel palpitations and denies SOB or chest pain. HR normalized on pulse ox when he stood up and had no symptoms. Here because he is more concerned of possible AE's of his fluconazole leading to abnormal electrolytes. Pt has benign exam. Will check labs and imaging and EKG and reassess. 50 yo M with HTN, Systolic HF on Entresto, s/p ablation Aug 19, 2020 and recent Stress and ECHO that presents with palpitations this AM. Pt reports he only knew he had palpitations due to his pulse ox reading and didn't actually feel palpitations and denies SOB or chest pain. HR normalized on pulse ox when he stood up and had no symptoms. Here because he is more concerned of possible AE's of his fluconazole leading to abnormal electrolytes. Pt has benign exam. Will check labs and imaging and EKG and reassess. Chart review shows pt had extensive cardiac work up recently by his cardiologist including ECHO, Stress, which shows possible decreased EF but normal stress. Also s/p ablation in Aug and he reports he hasn't felt palpitations since until two days ago.

## 2020-09-10 NOTE — ED PROVIDER NOTE - ATTENDING CONTRIBUTION TO CARE
I, Dr Bereket Carballo wrote the initial note in its entirety and the resident only contributed to the progress note and disposition with which I agree.

## 2020-09-10 NOTE — ED PROVIDER NOTE - NSFOLLOWUPINSTRUCTIONS_ED_ALL_ED_FT
You were seen an evaluated in the emergency room for palpitations.    Please follow up with Dr. Valdes. Call the number attached to make an appointment.     Your blood work results are attached.    Continue all home medications as prescribed.    Seek immediate medical attention for any worsening, new, or concerning symptoms.    Please read all attached.

## 2020-09-10 NOTE — ED PROVIDER NOTE - CARE PROVIDER_API CALL
Massimo Valdes  CARDIOLOGY  [address] [address] [place] [address], [place] [address]  Phone: [phone]  Fax: [phone]  Follow Up Time:

## 2020-09-10 NOTE — ED PROVIDER NOTE - PATIENT PORTAL LINK FT
You can access the FollowMyHealth Patient Portal offered by [facility] by registering at the following website: [email or web address]. By joining [facility]’s FollowMyHealth portal, you will also be able to view your health information using other applications (apps) compatible with our system.

## 2020-10-08 ENCOUNTER — APPOINTMENT (OUTPATIENT)
Dept: CARDIOLOGY | Facility: CLINIC | Age: 50
End: 2020-10-08
Payer: COMMERCIAL

## 2020-10-08 ENCOUNTER — APPOINTMENT (OUTPATIENT)
Dept: ELECTROPHYSIOLOGY | Facility: CLINIC | Age: 50
End: 2020-10-08
Payer: COMMERCIAL

## 2020-10-08 VITALS
BODY MASS INDEX: 22.89 KG/M2 | HEIGHT: 69 IN | HEART RATE: 94 BPM | DIASTOLIC BLOOD PRESSURE: 85 MMHG | TEMPERATURE: 97.8 F | SYSTOLIC BLOOD PRESSURE: 137 MMHG | OXYGEN SATURATION: 100 %

## 2020-10-08 VITALS — BODY MASS INDEX: 22.89 KG/M2 | WEIGHT: 155 LBS

## 2020-10-08 VITALS — SYSTOLIC BLOOD PRESSURE: 134 MMHG | DIASTOLIC BLOOD PRESSURE: 79 MMHG

## 2020-10-08 PROCEDURE — 93000 ELECTROCARDIOGRAM COMPLETE: CPT

## 2020-10-08 PROCEDURE — 99215 OFFICE O/P EST HI 40 MIN: CPT

## 2020-10-08 PROCEDURE — 99214 OFFICE O/P EST MOD 30 MIN: CPT

## 2020-10-08 RX ORDER — CARVEDILOL 3.12 MG/1
3.12 TABLET, FILM COATED ORAL
Qty: 180 | Refills: 3 | Status: ACTIVE | COMMUNITY
Start: 2020-10-08 | End: 1900-01-01

## 2020-10-08 NOTE — HISTORY OF PRESENT ILLNESS
[FreeTextEntry1] : Massimo Valdes MD\par \par Duncan Garcia is a 51y/o man with Hx of HTN and NICM/moderate LV dysfunction in setting of frequent PVCs, on Entresto, and now s/p lateral tricuspid annulus PVC ablation on 8/19/2020 who presents today for routine f/u.

## 2020-10-08 NOTE — DISCUSSION/SUMMARY
[FreeTextEntry1] : Duncan Garcia is a 51y/o man with Hx of HTN and NICM/moderate LV dysfunction in setting of frequent PVCs, on Entresto, and now s/p lateral tricuspid annulus PVC ablation on 8/19/2020 who presents today for routine f/u. \par \par Impression:\par \par 1. PVCs: s/p lateral tricuspid annulus PVC ablation on 8/19/2020. EKG today NSR without evidence of PVCs. \par \par 2. HTN: Encouraged heart healthy diet, sodium restriction, and weight loss. Continue regular f/u with Cardiologist for further HTN management.\par \par 3. Moderate LV dysfunction: may be PVC induced. Resume Entresto as prescribed and plan for repeat ECHO. \par

## 2020-10-08 NOTE — PHYSICAL EXAM
[General Appearance - Well Developed] : well developed [Normal Appearance] : normal appearance [Well Groomed] : well groomed [General Appearance - Well Nourished] : well nourished [No Deformities] : no deformities [General Appearance - In No Acute Distress] : no acute distress [Normal Conjunctiva] : the conjunctiva exhibited no abnormalities [Eyelids - No Xanthelasma] : the eyelids demonstrated no xanthelasmas [Normal Oral Mucosa] : normal oral mucosa [No Oral Pallor] : no oral pallor [No Oral Cyanosis] : no oral cyanosis [Normal Jugular Venous A Waves Present] : normal jugular venous A waves present [Normal Jugular Venous V Waves Present] : normal jugular venous V waves present [No Jugular Venous Vila A Waves] : no jugular venous vlia A waves [Respiration, Rhythm And Depth] : normal respiratory rhythm and effort [Exaggerated Use Of Accessory Muscles For Inspiration] : no accessory muscle use [Auscultation Breath Sounds / Voice Sounds] : lungs were clear to auscultation bilaterally [Heart Rate And Rhythm] : heart rate and rhythm were normal [Heart Sounds] : normal S1 and S2 [Murmurs] : no murmurs present [Abdomen Soft] : soft [Abdomen Tenderness] : non-tender [Abdomen Mass (___ Cm)] : no abdominal mass palpated [Abnormal Walk] : normal gait [Gait - Sufficient For Exercise Testing] : the gait was sufficient for exercise testing [Nail Clubbing] : no clubbing of the fingernails [Cyanosis, Localized] : no localized cyanosis [Petechial Hemorrhages (___cm)] : no petechial hemorrhages [Skin Color & Pigmentation] : normal skin color and pigmentation [] : no rash [No Venous Stasis] : no venous stasis [Skin Lesions] : no skin lesions [No Skin Ulcers] : no skin ulcer [No Xanthoma] : no  xanthoma was observed [Oriented To Time, Place, And Person] : oriented to person, place, and time [Affect] : the affect was normal [Mood] : the mood was normal [No Anxiety] : not feeling anxious

## 2020-10-09 ENCOUNTER — NON-APPOINTMENT (OUTPATIENT)
Age: 50
End: 2020-10-09

## 2020-10-20 ENCOUNTER — APPOINTMENT (OUTPATIENT)
Dept: CARDIOLOGY | Facility: CLINIC | Age: 50
End: 2020-10-20
Payer: COMMERCIAL

## 2020-10-20 DIAGNOSIS — U07.1 COVID-19: ICD-10-CM

## 2020-10-20 DIAGNOSIS — I50.9 HEART FAILURE, UNSPECIFIED: ICD-10-CM

## 2020-10-20 DIAGNOSIS — I10 ESSENTIAL (PRIMARY) HYPERTENSION: ICD-10-CM

## 2020-10-20 DIAGNOSIS — Z98.890 OTHER SPECIFIED POSTPROCEDURAL STATES: ICD-10-CM

## 2020-10-20 DIAGNOSIS — R00.1 BRADYCARDIA, UNSPECIFIED: ICD-10-CM

## 2020-10-20 DIAGNOSIS — Z86.79 OTHER SPECIFIED POSTPROCEDURAL STATES: ICD-10-CM

## 2020-10-20 DIAGNOSIS — I51.9 HEART DISEASE, UNSPECIFIED: ICD-10-CM

## 2020-10-20 DIAGNOSIS — I49.3 VENTRICULAR PREMATURE DEPOLARIZATION: ICD-10-CM

## 2020-10-20 DIAGNOSIS — I42.8 OTHER CARDIOMYOPATHIES: ICD-10-CM

## 2020-10-20 DIAGNOSIS — R00.2 PALPITATIONS: ICD-10-CM

## 2020-10-20 PROCEDURE — 99214 OFFICE O/P EST MOD 30 MIN: CPT | Mod: 95

## 2020-10-24 PROBLEM — I10 BENIGN ESSENTIAL HYPERTENSION: Status: ACTIVE | Noted: 2020-07-16

## 2020-10-24 PROBLEM — R00.2 HEART PALPITATIONS: Status: ACTIVE | Noted: 2020-05-14

## 2020-10-24 PROBLEM — R00.1 BRADYCARDIA: Status: ACTIVE | Noted: 2020-05-14

## 2020-10-24 PROBLEM — U07.1 COVID-19: Status: ACTIVE | Noted: 2020-05-14

## 2020-10-24 PROBLEM — Z98.890 S/P ABLATION OF VENTRICULAR ARRHYTHMIA: Status: ACTIVE | Noted: 2020-10-08

## 2020-10-24 PROBLEM — I42.8 CARDIOMYOPATHY, NONISCHEMIC: Status: ACTIVE | Noted: 2020-05-22

## 2020-10-24 PROBLEM — I50.9 CONGENITAL HEART FAILURE: Status: ACTIVE | Noted: 2020-05-14

## 2020-10-24 PROBLEM — I49.3 FREQUENT PVCS: Status: ACTIVE | Noted: 2020-05-14

## 2020-11-19 ENCOUNTER — APPOINTMENT (OUTPATIENT)
Dept: ELECTROPHYSIOLOGY | Facility: CLINIC | Age: 50
End: 2020-11-19

## 2020-11-19 ENCOUNTER — APPOINTMENT (OUTPATIENT)
Dept: CV DIAGNOSITCS | Facility: HOSPITAL | Age: 50
End: 2020-11-19
Payer: COMMERCIAL

## 2020-11-19 ENCOUNTER — OUTPATIENT (OUTPATIENT)
Dept: OUTPATIENT SERVICES | Facility: HOSPITAL | Age: 50
LOS: 1 days | End: 2020-11-19

## 2020-11-19 DIAGNOSIS — I51.9 HEART DISEASE, UNSPECIFIED: ICD-10-CM

## 2020-11-19 PROCEDURE — 93306 TTE W/DOPPLER COMPLETE: CPT | Mod: 26

## 2022-10-13 PROBLEM — I51.9 MODERATE LEFT VENTRICULAR SYSTOLIC DYSFUNCTION: Status: ACTIVE | Noted: 2020-07-16
